# Patient Record
Sex: FEMALE | Race: WHITE | NOT HISPANIC OR LATINO | Employment: STUDENT | URBAN - METROPOLITAN AREA
[De-identification: names, ages, dates, MRNs, and addresses within clinical notes are randomized per-mention and may not be internally consistent; named-entity substitution may affect disease eponyms.]

---
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
## 2022-01-04 ENCOUNTER — IMMUNIZATION (OUTPATIENT)
Dept: FAMILY MEDICINE | Facility: CLINIC | Age: 22
End: 2022-01-04
Payer: COMMERCIAL

## 2022-01-04 ENCOUNTER — IMMUNIZATION (OUTPATIENT)
Dept: PHARMACY | Facility: CLINIC | Age: 22
End: 2022-01-04
Payer: COMMERCIAL

## 2022-01-04 DIAGNOSIS — Z23 NEED FOR VACCINATION: Primary | ICD-10-CM

## 2022-01-04 PROCEDURE — 0004A COVID-19, MRNA, LNP-S, PF, 30 MCG/0.3 ML DOSE VACCINE: CPT | Mod: PBBFAC,PO

## 2022-04-21 ENCOUNTER — OFFICE VISIT (OUTPATIENT)
Dept: OTOLARYNGOLOGY | Facility: CLINIC | Age: 22
End: 2022-04-21
Payer: COMMERCIAL

## 2022-04-21 DIAGNOSIS — M95.0 NASAL VALVE COLLAPSE: ICD-10-CM

## 2022-04-21 DIAGNOSIS — M95.0 NASAL DEFORMITY: Primary | ICD-10-CM

## 2022-04-21 DIAGNOSIS — J34.89 NASAL OBSTRUCTION: ICD-10-CM

## 2022-04-21 PROCEDURE — 1159F MED LIST DOCD IN RCRD: CPT | Mod: CPTII,95,, | Performed by: OTOLARYNGOLOGY

## 2022-04-21 PROCEDURE — 1159F PR MEDICATION LIST DOCUMENTED IN MEDICAL RECORD: ICD-10-PCS | Mod: CPTII,95,, | Performed by: OTOLARYNGOLOGY

## 2022-04-21 PROCEDURE — 99213 PR OFFICE/OUTPT VISIT, EST, LEVL III, 20-29 MIN: ICD-10-PCS | Mod: 95,,, | Performed by: OTOLARYNGOLOGY

## 2022-04-21 PROCEDURE — 1160F PR REVIEW ALL MEDS BY PRESCRIBER/CLIN PHARMACIST DOCUMENTED: ICD-10-PCS | Mod: CPTII,95,, | Performed by: OTOLARYNGOLOGY

## 2022-04-21 PROCEDURE — 1160F RVW MEDS BY RX/DR IN RCRD: CPT | Mod: CPTII,95,, | Performed by: OTOLARYNGOLOGY

## 2022-04-21 PROCEDURE — 99213 OFFICE O/P EST LOW 20 MIN: CPT | Mod: 95,,, | Performed by: OTOLARYNGOLOGY

## 2022-04-21 NOTE — PROGRESS NOTES
Subjective:     Chief Complaint:   Follow up    Sophia Ann Boeckl is a 21 y.o. female who present for postoperative visit.     Patient underwent open septorhinoplasty, ITR on 2/10/21.   Since then, she has been doing well. Breathing much improved but still some limitation on the right. No epistaxis. She has been doing some nasal massages. Feels the nose is straighter but still some external deviation.     Answers for HPI/ROS submitted by the patient on 5/25/2021  None of these: Yes  None of these: Yes  None of these : Yes  None of these: Yes  None of these : Yes  None of these: Yes  None of these: Yes  None of these: Yes  None of these : Yes  None of these: Yes  None of these : Yes  None of these: Yes  None of these: Yes  None of these: Yes        Past Medical History  She has a past medical history of Deviated septum.    Past Surgical History  She has a past surgical history that includes Tonsillectomy; Adenoidectomy; Mart tooth extraction; Nasal turbinate reduction (Bilateral, 2/10/2021); and Rhinoplasty (N/A, 2/10/2021).    Family History  Her family history includes Amblyopia in her father; Breast cancer in her paternal aunt and paternal aunt; Cataracts in her maternal grandfather; Retinal detachment in her maternal aunt; Rhinitis in her maternal grandmother.    Social History  She reports that she has never smoked. She has never used smokeless tobacco. She reports previous alcohol use. She reports that she does not use drugs.    Allergies  She has No Known Allergies.    Medications  She has a current medication list which includes the following prescription(s): kyleena and sumatriptan.       Objective:     There were no vitals taken for this visit.     Constitutional:   Vital signs are normal. She appears well-developed and well-nourished.     Head:  Normocephalic. Salivary glands normal.      Nose:          Procedure    None    Assessment:     1. Nasal deformity    2. Nasal valve collapse    3. Nasal  obstruction       s/p open septorhinoplasty, ITR 2/10/21  Plan:     Patient is doing well postoperatively. Her breathing has improved but still limited on the right. Snoring resolved. She does have slight asymmetry of the middle third/internal nasal valve region with restriction of breathing on the right. Will plan for photos, likely will need in-person evaluation to assess nasal valve. Discussed the risks and benefits of revision nasal valve repair. Discussed likely need for auricular cartilage graft. Patient voices understanding and wishes to proceed. All questions answered and patient encouraged to call with any questions or concerns.

## 2022-04-22 ENCOUNTER — TELEPHONE (OUTPATIENT)
Dept: OTOLARYNGOLOGY | Facility: CLINIC | Age: 22
End: 2022-04-22
Payer: COMMERCIAL

## 2022-04-22 DIAGNOSIS — Z01.818 PRE-OP TESTING: Primary | ICD-10-CM

## 2022-04-22 NOTE — TELEPHONE ENCOUNTER
Called and spoke with the patient and scheduled her surgery, photos, and post op appt.  Will mail her pre/post op information to her house.  Given the phone number to call to set up her pre-admit appt.  Told to call next Friday after Dr. Culp has a chance to put her surgery in.  Confirmed she has the portal to see all of her appts.

## 2022-04-28 DIAGNOSIS — M95.0 NASAL DEFORMITY: Primary | ICD-10-CM

## 2022-04-28 DIAGNOSIS — J34.89 NASAL OBSTRUCTION: ICD-10-CM

## 2022-04-28 DIAGNOSIS — M95.0 NASAL VALVE COLLAPSE: ICD-10-CM

## 2022-04-28 RX ORDER — SODIUM CHLORIDE 0.9 % (FLUSH) 0.9 %
3 SYRINGE (ML) INJECTION
Status: CANCELLED | OUTPATIENT
Start: 2022-04-28

## 2022-04-28 RX ORDER — LIDOCAINE HYDROCHLORIDE 10 MG/ML
1 INJECTION, SOLUTION EPIDURAL; INFILTRATION; INTRACAUDAL; PERINEURAL ONCE
Status: CANCELLED | OUTPATIENT
Start: 2022-04-28 | End: 2022-04-28

## 2022-05-02 ENCOUNTER — TELEPHONE (OUTPATIENT)
Dept: OTOLARYNGOLOGY | Facility: CLINIC | Age: 22
End: 2022-05-02
Payer: COMMERCIAL

## 2022-05-02 NOTE — TELEPHONE ENCOUNTER
Spoke with patient and she was going to take a final.  She states she will call back to try to schedule an appt later today.

## 2022-05-02 NOTE — TELEPHONE ENCOUNTER
----- Message from Lamine Culp MD sent at 4/28/2022  9:03 PM CDT -----  Regarding: preop appt  I know I saw this patient virtually, but I think for insurance approval, we will need to see her in person to document. Can you please get her set up to see me sometime? She has photos scheduled in early May to could maybe coordinate.     Thanks,  Lamine

## 2022-05-04 ENCOUNTER — TELEPHONE (OUTPATIENT)
Dept: OTOLARYNGOLOGY | Facility: CLINIC | Age: 22
End: 2022-05-04
Payer: COMMERCIAL

## 2022-05-04 ENCOUNTER — PATIENT MESSAGE (OUTPATIENT)
Dept: OTOLARYNGOLOGY | Facility: CLINIC | Age: 22
End: 2022-05-04
Payer: COMMERCIAL

## 2022-05-10 ENCOUNTER — PATIENT MESSAGE (OUTPATIENT)
Dept: OTOLARYNGOLOGY | Facility: CLINIC | Age: 22
End: 2022-05-10
Payer: COMMERCIAL

## 2022-05-11 ENCOUNTER — TELEPHONE (OUTPATIENT)
Dept: OTOLARYNGOLOGY | Facility: CLINIC | Age: 22
End: 2022-05-11
Payer: COMMERCIAL

## 2022-05-11 NOTE — TELEPHONE ENCOUNTER
Called and patient needed to reschedule her surgery since her father is dying from cancer. Changed surgery date and all corresponding appts.  Told to call back to reschedule her follow up prior to her new surgery date.

## 2022-05-23 ENCOUNTER — PATIENT MESSAGE (OUTPATIENT)
Dept: OTOLARYNGOLOGY | Facility: CLINIC | Age: 22
End: 2022-05-23
Payer: COMMERCIAL

## 2022-06-06 ENCOUNTER — TELEPHONE (OUTPATIENT)
Dept: OTOLARYNGOLOGY | Facility: CLINIC | Age: 22
End: 2022-06-06
Payer: COMMERCIAL

## 2022-06-06 NOTE — TELEPHONE ENCOUNTER
Left a message that I was in the process of writing up the appeal and noticed her photo appt was cancelled.  I need for her to call back at [phone] and ask to be put through to [facility] and ask for Bekah.  I will need the photos to send in with the appeal and it takes up to 30 days for them to review the appeal.

## 2022-06-08 ENCOUNTER — TELEPHONE (OUTPATIENT)
Dept: OTOLARYNGOLOGY | Facility: CLINIC | Age: 22
End: 2022-06-08
Payer: COMMERCIAL

## 2022-06-23 ENCOUNTER — OFFICE VISIT (OUTPATIENT)
Dept: URGENT CARE | Facility: CLINIC | Age: 22
End: 2022-06-23
Payer: COMMERCIAL

## 2022-06-23 VITALS
RESPIRATION RATE: 18 BRPM | WEIGHT: 150 LBS | HEART RATE: 87 BPM | DIASTOLIC BLOOD PRESSURE: 72 MMHG | TEMPERATURE: 99 F | BODY MASS INDEX: 22.73 KG/M2 | SYSTOLIC BLOOD PRESSURE: 103 MMHG | OXYGEN SATURATION: 97 % | HEIGHT: 68 IN

## 2022-06-23 DIAGNOSIS — J02.9 ACUTE VIRAL PHARYNGITIS: Primary | ICD-10-CM

## 2022-06-23 LAB
CTP QC/QA: YES
CTP QC/QA: YES
MOLECULAR STREP A: NEGATIVE
SARS-COV-2 RDRP RESP QL NAA+PROBE: NEGATIVE

## 2022-06-23 PROCEDURE — 3008F PR BODY MASS INDEX (BMI) DOCUMENTED: ICD-10-PCS | Mod: CPTII,S$GLB,, | Performed by: STUDENT IN AN ORGANIZED HEALTH CARE EDUCATION/TRAINING PROGRAM

## 2022-06-23 PROCEDURE — 1160F RVW MEDS BY RX/DR IN RCRD: CPT | Mod: CPTII,S$GLB,, | Performed by: STUDENT IN AN ORGANIZED HEALTH CARE EDUCATION/TRAINING PROGRAM

## 2022-06-23 PROCEDURE — 87651 POCT STREP A MOLECULAR: ICD-10-PCS | Mod: QW,S$GLB,, | Performed by: STUDENT IN AN ORGANIZED HEALTH CARE EDUCATION/TRAINING PROGRAM

## 2022-06-23 PROCEDURE — 3078F DIAST BP <80 MM HG: CPT | Mod: CPTII,S$GLB,, | Performed by: STUDENT IN AN ORGANIZED HEALTH CARE EDUCATION/TRAINING PROGRAM

## 2022-06-23 PROCEDURE — 1160F PR REVIEW ALL MEDS BY PRESCRIBER/CLIN PHARMACIST DOCUMENTED: ICD-10-PCS | Mod: CPTII,S$GLB,, | Performed by: STUDENT IN AN ORGANIZED HEALTH CARE EDUCATION/TRAINING PROGRAM

## 2022-06-23 PROCEDURE — 1159F MED LIST DOCD IN RCRD: CPT | Mod: CPTII,S$GLB,, | Performed by: STUDENT IN AN ORGANIZED HEALTH CARE EDUCATION/TRAINING PROGRAM

## 2022-06-23 PROCEDURE — 3074F PR MOST RECENT SYSTOLIC BLOOD PRESSURE < 130 MM HG: ICD-10-PCS | Mod: CPTII,S$GLB,, | Performed by: STUDENT IN AN ORGANIZED HEALTH CARE EDUCATION/TRAINING PROGRAM

## 2022-06-23 PROCEDURE — 99213 OFFICE O/P EST LOW 20 MIN: CPT | Mod: S$GLB,,, | Performed by: STUDENT IN AN ORGANIZED HEALTH CARE EDUCATION/TRAINING PROGRAM

## 2022-06-23 PROCEDURE — U0002: ICD-10-PCS | Mod: QW,S$GLB,, | Performed by: STUDENT IN AN ORGANIZED HEALTH CARE EDUCATION/TRAINING PROGRAM

## 2022-06-23 PROCEDURE — 99213 PR OFFICE/OUTPT VISIT, EST, LEVL III, 20-29 MIN: ICD-10-PCS | Mod: S$GLB,,, | Performed by: STUDENT IN AN ORGANIZED HEALTH CARE EDUCATION/TRAINING PROGRAM

## 2022-06-23 PROCEDURE — U0002 COVID-19 LAB TEST NON-CDC: HCPCS | Mod: QW,S$GLB,, | Performed by: STUDENT IN AN ORGANIZED HEALTH CARE EDUCATION/TRAINING PROGRAM

## 2022-06-23 PROCEDURE — 3074F SYST BP LT 130 MM HG: CPT | Mod: CPTII,S$GLB,, | Performed by: STUDENT IN AN ORGANIZED HEALTH CARE EDUCATION/TRAINING PROGRAM

## 2022-06-23 PROCEDURE — 1159F PR MEDICATION LIST DOCUMENTED IN MEDICAL RECORD: ICD-10-PCS | Mod: CPTII,S$GLB,, | Performed by: STUDENT IN AN ORGANIZED HEALTH CARE EDUCATION/TRAINING PROGRAM

## 2022-06-23 PROCEDURE — 3078F PR MOST RECENT DIASTOLIC BLOOD PRESSURE < 80 MM HG: ICD-10-PCS | Mod: CPTII,S$GLB,, | Performed by: STUDENT IN AN ORGANIZED HEALTH CARE EDUCATION/TRAINING PROGRAM

## 2022-06-23 PROCEDURE — 87651 STREP A DNA AMP PROBE: CPT | Mod: QW,S$GLB,, | Performed by: STUDENT IN AN ORGANIZED HEALTH CARE EDUCATION/TRAINING PROGRAM

## 2022-06-23 PROCEDURE — 3008F BODY MASS INDEX DOCD: CPT | Mod: CPTII,S$GLB,, | Performed by: STUDENT IN AN ORGANIZED HEALTH CARE EDUCATION/TRAINING PROGRAM

## 2022-06-23 NOTE — PROGRESS NOTES
"Subjective:       Patient ID: Sophia Ann Boeckl is a 21 y.o. female.    Vitals:  height is 5' 8" (1.727 m) and weight is 68 kg (150 lb). Her oral temperature is 98.5 °F (36.9 °C). Her blood pressure is 103/72 and her pulse is 87. Her respiration is 18 and oxygen saturation is 97%.     Chief Complaint: Sore Throat    Patient states she is having a sore throat and she noticed white spots where her tonsils would be. She complains of swollen glands and states "it's like a golf ball when I try to swallow"    Sore Throat   This is a new problem. The current episode started in the past 7 days (Tuesday ). The problem has been gradually worsening. Neither side of throat is experiencing more pain than the other. There has been no fever. The pain is at a severity of 5/10. The pain is moderate. Associated symptoms include swollen glands and trouble swallowing. Pertinent negatives include no abdominal pain, congestion, coughing, diarrhea, drooling, ear discharge, ear pain, headaches, hoarse voice, plugged ear sensation, neck pain, shortness of breath, stridor or vomiting. She has had no exposure to strep or mono. She has tried NSAIDs for the symptoms. The treatment provided mild relief.       HENT: Positive for sore throat and trouble swallowing. Negative for ear pain, ear discharge, drooling and congestion.    Neck: Negative for neck pain.   Respiratory: Negative for cough, shortness of breath and stridor.    Gastrointestinal: Negative for abdominal pain, vomiting and diarrhea.   Neurological: Negative for headaches.       Objective:      Physical Exam   Constitutional: She is oriented to person, place, and time. She does not appear ill. No distress.   HENT:   Head: Normocephalic.   Mouth/Throat: Uvula is midline. Mucous membranes are moist. Posterior oropharyngeal erythema (mild) present. No oropharyngeal exudate. Tonsils are 0 on the right. Tonsils are 0 on the left. Oropharynx is clear.   Eyes: Conjunctivae are normal. "   Pulmonary/Chest: No respiratory distress.   Neurological: She is alert and oriented to person, place, and time. Coordination normal.   Psychiatric: Her behavior is normal. Mood normal.   Nursing note and vitals reviewed.        Assessment:       1. Acute viral pharyngitis        Results for orders placed or performed in visit on 06/23/22   POCT Strep A, Molecular   Result Value Ref Range    Molecular Strep A, POC Negative Negative     Acceptable Yes    POCT COVID-19 Rapid Screening   Result Value Ref Range    POC Rapid COVID Negative Negative     Acceptable Yes        Plan:         Acute viral pharyngitis  -     POCT Strep A, Molecular  -     POCT COVID-19 Rapid Screening    Diagnosis and plan discussed with the patient as well as the expected course and duration of her symptoms.  Use  OTC medications as directed. All questions and concerns were addressed prior to discharge. Patient verbalized understanding and was agreeable with the plan of care. F/u prn persistent or worsening symptoms.

## 2022-06-23 NOTE — PATIENT INSTRUCTIONS
Below are suggestions for symptomatic relief of your upper respiratory symptoms:              -Salt water gargles to soothe throat pain.              -Chloroseptic spray and Cepacol lozenges also help to numb throat pain.              -Warm herbal teas with honey/lemon/nori can help soothe sore throat and hoarseness              -Nasal saline spray reduces inflammation and dryness.              -Warm face compresses to help with facial sinus pain/pressure.              -Humidifiers and steam can help with nasal dryness and congestion              -Vicks vapor rub at night.              -Flonase OTC or Nasacort OTC for nasal congestion and post-nasal drip. Ok to use twice daily for the first week, then reduce to once daily after symptoms have begun to improve.              -Afrin is a nasal spray that can give immediate relief of nasal congestion but you cannot use this medication for more than 3 days              -Simple foods like chicken noodle soup.              - Mucinex for congestion or Mucinex DM for cough during the day time. Delsym helps with coughing at night. Mucinex-D if you have sinus pressure/sinus pain or chest congestion. (caution if history of high blood pressure or palpitations). You must increase your water intake when using expectorants (Mucinex).            -Zyrtec/Claritin/Allegra/Xyzal should help with allergies.  -If you DO NOT have Hypertension or any history of palpitations, it is ok to take over the counter Sudafed or Mucinex D or Allegra-D or Claritin-D or Zyrtec-D.  -If you do take one of the above, it is ok to combine that with plain over the counter Mucinex or Allegra or Claritin or Zyrtec. If, for example, you are taking Zyrtec -D, you can combine that with Mucinex, but not Mucinex-D.  If you are taking Mucinex-D, you can combine that with plain Allegra or Claritin or Zyrtec.   -If you DO have Hypertension or palpitations, it is safe to take Coricidin HBP for relief of sinus  symptoms.

## 2022-07-04 ENCOUNTER — OFFICE VISIT (OUTPATIENT)
Dept: URGENT CARE | Facility: CLINIC | Age: 22
End: 2022-07-04
Payer: COMMERCIAL

## 2022-07-04 VITALS
BODY MASS INDEX: 22.73 KG/M2 | RESPIRATION RATE: 18 BRPM | SYSTOLIC BLOOD PRESSURE: 101 MMHG | DIASTOLIC BLOOD PRESSURE: 65 MMHG | HEIGHT: 68 IN | WEIGHT: 150 LBS | OXYGEN SATURATION: 99 % | HEART RATE: 91 BPM | TEMPERATURE: 99 F

## 2022-07-04 DIAGNOSIS — S70.362A INFECTED INSECT BITE OF LEFT THIGH, INITIAL ENCOUNTER: Primary | ICD-10-CM

## 2022-07-04 DIAGNOSIS — L08.9 INFECTED INSECT BITE OF LEFT THIGH, INITIAL ENCOUNTER: Primary | ICD-10-CM
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
## 2022-07-04 PROCEDURE — 1159F MED LIST DOCD IN RCRD: CPT | Mod: CPTII,S$GLB,, | Performed by: PHYSICIAN ASSISTANT

## 2022-07-04 PROCEDURE — 3008F BODY MASS INDEX DOCD: CPT | Mod: CPTII,S$GLB,, | Performed by: PHYSICIAN ASSISTANT

## 2022-07-04 PROCEDURE — 3074F SYST BP LT 130 MM HG: CPT | Mod: CPTII,S$GLB,, | Performed by: PHYSICIAN ASSISTANT

## 2022-07-04 PROCEDURE — 1160F RVW MEDS BY RX/DR IN RCRD: CPT | Mod: CPTII,S$GLB,, | Performed by: PHYSICIAN ASSISTANT

## 2022-07-04 PROCEDURE — 1160F PR REVIEW ALL MEDS BY PRESCRIBER/CLIN PHARMACIST DOCUMENTED: ICD-10-PCS | Mod: CPTII,S$GLB,, | Performed by: PHYSICIAN ASSISTANT

## 2022-07-04 PROCEDURE — 99203 PR OFFICE/OUTPT VISIT, NEW, LEVL III, 30-44 MIN: ICD-10-PCS | Mod: S$GLB,,, | Performed by: PHYSICIAN ASSISTANT

## 2022-07-04 PROCEDURE — 1159F PR MEDICATION LIST DOCUMENTED IN MEDICAL RECORD: ICD-10-PCS | Mod: CPTII,S$GLB,, | Performed by: PHYSICIAN ASSISTANT

## 2022-07-04 PROCEDURE — 3008F PR BODY MASS INDEX (BMI) DOCUMENTED: ICD-10-PCS | Mod: CPTII,S$GLB,, | Performed by: PHYSICIAN ASSISTANT

## 2022-07-04 PROCEDURE — 99203 OFFICE O/P NEW LOW 30 MIN: CPT | Mod: S$GLB,,, | Performed by: PHYSICIAN ASSISTANT

## 2022-07-04 PROCEDURE — 3078F DIAST BP <80 MM HG: CPT | Mod: CPTII,S$GLB,, | Performed by: PHYSICIAN ASSISTANT

## 2022-07-04 PROCEDURE — 3074F PR MOST RECENT SYSTOLIC BLOOD PRESSURE < 130 MM HG: ICD-10-PCS | Mod: CPTII,S$GLB,, | Performed by: PHYSICIAN ASSISTANT

## 2022-07-04 PROCEDURE — 3078F PR MOST RECENT DIASTOLIC BLOOD PRESSURE < 80 MM HG: ICD-10-PCS | Mod: CPTII,S$GLB,, | Performed by: PHYSICIAN ASSISTANT

## 2022-07-04 RX ORDER — MUPIROCIN 20 MG/G
OINTMENT TOPICAL 4 TIMES DAILY
Qty: 2 G | Refills: 0 | Status: SHIPPED | OUTPATIENT
Start: 2022-07-04 | End: 2022-07-04

## 2022-07-04 RX ORDER — MUPIROCIN 20 MG/G
OINTMENT TOPICAL 4 TIMES DAILY
Qty: 2 G | Refills: 0 | Status: SHIPPED | OUTPATIENT
Start: 2022-07-04 | End: 2022-07-25

## 2022-07-04 RX ORDER — DOXYCYCLINE 100 MG/1
100 CAPSULE ORAL
Qty: 14 CAPSULE | Refills: 0 | Status: SHIPPED | OUTPATIENT
Start: 2022-07-04 | End: 2022-07-04

## 2022-07-04 RX ORDER — DOXYCYCLINE 100 MG/1
100 CAPSULE ORAL
Qty: 14 CAPSULE | Refills: 0 | Status: SHIPPED | OUTPATIENT
Start: 2022-07-04 | End: 2022-07-25

## 2022-07-04 NOTE — PROGRESS NOTES
"Subjective:       Patient ID: Sophia Ann Boeckl is a 21 y.o. female.    Vitals:  height is 5' 8" (1.727 m) and weight is 68 kg (150 lb). Her temperature is 98.7 °F (37.1 °C). Her blood pressure is 101/65 and her pulse is 91. Her respiration is 18 and oxygen saturation is 99%.     Chief Complaint: Insect Bite    Pt states pain to touch.   Pt states hot and hard to touch,  Pt states swelling noticed as well.   Pt states bite is on back of leg.   Patient thinks she got mid mass pattern insect 2 days ago.     Insect Bite  This is a new problem. The current episode started today. The problem occurs constantly. The problem has been unchanged. Pertinent negatives include no abdominal pain, anorexia, arthralgias, change in bowel habit, chest pain, chills, congestion, coughing, diaphoresis, fatigue, fever, headaches, joint swelling, myalgias, nausea, neck pain, numbness, rash, sore throat, swollen glands, urinary symptoms, vertigo, visual change, vomiting or weakness. She has tried NSAIDs (After-bite) for the symptoms. The treatment provided mild relief.       Constitution: Negative for chills, sweating, fatigue and fever.   HENT: Negative for congestion and sore throat.    Neck: Negative for neck pain.   Cardiovascular: Negative for chest pain.   Respiratory: Negative for cough.    Gastrointestinal: Negative for abdominal pain, nausea and vomiting.   Musculoskeletal: Negative for joint pain, joint swelling and muscle ache.   Skin: Positive for erythema. Negative for rash.   Neurological: Negative for history of vertigo, headaches and numbness.       Objective:      Physical Exam   Constitutional: She is oriented to person, place, and time. She appears well-developed. She is cooperative.  Non-toxic appearance. She does not appear ill. No distress.   HENT:   Head: Normocephalic and atraumatic.   Ears:   Right Ear: Hearing and external ear normal.   Left Ear: Hearing and external ear normal.   Nose: Nose normal. No mucosal " edema, rhinorrhea, nasal deformity or congestion. No epistaxis. Right sinus exhibits no maxillary sinus tenderness and no frontal sinus tenderness. Left sinus exhibits no maxillary sinus tenderness and no frontal sinus tenderness.   Mouth/Throat: Uvula is midline, oropharynx is clear and moist and mucous membranes are normal. No trismus in the jaw. Normal dentition. No uvula swelling. No oropharyngeal exudate, posterior oropharyngeal edema or posterior oropharyngeal erythema.   Eyes: Conjunctivae and lids are normal. Pupils are equal, round, and reactive to light. No scleral icterus. Extraocular movement intact   Neck: Trachea normal and phonation normal. Neck supple. No edema present. No erythema present. No neck rigidity present.   Cardiovascular: Normal rate, regular rhythm, normal heart sounds and normal pulses.   Pulmonary/Chest: Effort normal and breath sounds normal. No stridor. No respiratory distress. She has no decreased breath sounds. She has no wheezes. She has no rhonchi.   Abdominal: Normal appearance. Soft. There is no abdominal tenderness. There is no guarding, no left CVA tenderness and no right CVA tenderness.   Musculoskeletal: Normal range of motion.         General: No deformity. Normal range of motion.        Legs:       Comments: Small area of erythema about 4 cm no induration and no fluctuance.  There is a central area that is about 1 mm oval with hyper erythema.  The area is mildly tender to palpation.  No tracking cellulitis.   Neurological: no focal deficit. She is alert and oriented to person, place, and time. She exhibits normal muscle tone. Coordination normal.   Skin: Skin is warm, dry, intact, not diaphoretic and not pale. Capillary refill takes less than 2 seconds. erythema jaundice  Psychiatric: Her speech is normal and behavior is normal. Judgment and thought content normal.   Nursing note and vitals reviewed.        Assessment:       1. Infected insect bite of left thigh, initial  encounter          Plan:         Infected insect bite of left thigh, initial encounter  -     doxycycline (VIBRAMYCIN) 100 MG Cap; Take 1 capsule (100 mg total) by mouth 2 times daily 2 hours after meal.  Dispense: 14 capsule; Refill: 0  -     mupirocin (BACTROBAN) 2 % ointment; Apply topically 4 (four) times daily.  Dispense: 2 g; Refill: 0    Follow up if symptoms worsen or fail to improve, for F/U with PCP or ED. There are no Patient Instructions on file for this visit.

## 2022-07-19 ENCOUNTER — PATIENT MESSAGE (OUTPATIENT)
Dept: SURGERY | Facility: OTHER | Age: 22
End: 2022-07-19
Payer: COMMERCIAL

## 2022-07-25 ENCOUNTER — HOSPITAL ENCOUNTER (OUTPATIENT)
Dept: PREADMISSION TESTING | Facility: OTHER | Age: 22
Discharge: HOME | End: 2022-07-25
Attending: OTOLARYNGOLOGY
Payer: COMMERCIAL

## 2022-07-25 ENCOUNTER — ANESTHESIA EVENT (OUTPATIENT)
Dept: SURGERY | Facility: OTHER | Age: 22
End: 2022-07-25
Payer: COMMERCIAL

## 2022-07-25 VITALS
WEIGHT: 150 LBS | HEIGHT: 68 IN | BODY MASS INDEX: 22.73 KG/M2 | RESPIRATION RATE: 16 BRPM | TEMPERATURE: 98 F | SYSTOLIC BLOOD PRESSURE: 111 MMHG | OXYGEN SATURATION: 99 % | DIASTOLIC BLOOD PRESSURE: 67 MMHG | HEART RATE: 74 BPM

## 2022-07-25 RX ORDER — LIDOCAINE HYDROCHLORIDE 10 MG/ML
0.5 INJECTION, SOLUTION EPIDURAL; INFILTRATION; INTRACAUDAL; PERINEURAL ONCE
Status: CANCELLED | OUTPATIENT
Start: 2022-07-25 | End: 2022-07-25

## 2022-07-25 RX ORDER — ACETAMINOPHEN 500 MG
1000 TABLET ORAL
Status: CANCELLED | OUTPATIENT
Start: 2022-07-25 | End: 2022-07-25

## 2022-07-25 RX ORDER — SODIUM CHLORIDE, SODIUM LACTATE, POTASSIUM CHLORIDE, CALCIUM CHLORIDE 600; 310; 30; 20 MG/100ML; MG/100ML; MG/100ML; MG/100ML
INJECTION, SOLUTION INTRAVENOUS CONTINUOUS
Status: CANCELLED | OUTPATIENT
Start: 2022-07-25

## 2022-07-25 NOTE — ANESTHESIA PREPROCEDURE EVALUATION
07/25/2022  Sophia Ann Boeckl is a 21 y.o., female.      Pre-op Assessment    I have reviewed the Patient Summary Reports.     I have reviewed the Nursing Notes.    I have reviewed the Medications.     Review of Systems  Anesthesia Hx:  Denies Family Hx of Anesthesia complications.   Denies Personal Hx of Anesthesia complications.   Social:  Non-Smoker    EENT/Dental:   chronic allergic rhinitis   Cardiovascular:  Cardiovascular Normal Exercise tolerance: good     Pulmonary:  Pulmonary Normal    Renal/:  Renal/ Normal     Hepatic/GI:   GERD    Neurological:   Headaches    Endocrine:  Endocrine Normal        Physical Exam  General: Cooperative, Alert and Oriented    Airway:  Mallampati: II   Mouth Opening: Normal  TM Distance: Normal  Tongue: Normal  Neck ROM: Normal ROM    Dental:  Intact        Anesthesia Plan  Type of Anesthesia, risks & benefits discussed:    Anesthesia Type: Gen ETT  Intra-op Monitoring Plan: Standard ASA Monitors  Post Op Pain Control Plan: multimodal analgesia  Induction:  IV  Airway Plan: Video, Post-Induction  Informed Consent: Informed consent signed with the Patient and all parties understand the risks and agree with anesthesia plan.  All questions answered.   ASA Score: 2  Anesthesia Plan Notes: No labs    Ready For Surgery From Anesthesia Perspective.     .

## 2022-07-25 NOTE — DISCHARGE INSTRUCTIONS
Information to Prepare you for your Surgery    PRE-ADMIT TESTING -  [phone]    [address] [facility]          Your surgery has been scheduled at [facility]. We are pleased to have the opportunity to serve you. For Further Information please call [phone].    On the day of surgery please report to the Information Desk on the 1st floor.    CONTACT YOUR PHYSICIAN'S OFFICE THE DAY PRIOR TO YOUR SURGERY TO OBTAIN YOUR ARRIVAL TIME.     The evening before surgery do not eat anything after 9 p.m. ( this includes hard candy, chewing gum and mints).  You may only have GATORADE, POWERADE AND WATER  from 9 p.m. until you leave your home.   DO NOT DRINK ANY LIQUIDS ON THE WAY TO THE HOSPITAL.      Why does your anesthesiologist allow you to drink Gatorade/Powerade before surgery?  Gatorade/Powerade helps to increase your comfort before surgery and to decrease your nausea after surgery. The carbohydrates in Gatorade/Powerade help reduce your body's stress response to surgery.  If you are a diabetic-drink only water prior to surgery.      Current Visitor policy(12/27/2021) - Patients may have 2 visitors pre and post procedure. Only 2 visitors will be allowed in the Surgical building with the patient.     SPECIAL MEDICATION INSTRUCTIONS: TAKE medications checked off by the Anesthesiologist on your Medication List.    Surgery Patients:  If you take ASPIRIN - Your PHYSICIAN/SURGEON will need to inform you IF/OR when you need to stop taking aspirin prior to your surgery.     Do Not take any medications containing IBUPROFEN.    Do Not Wear any make-up (especially eye make-up) to surgery. Please remove any false eyelashes or eyelash extensions. If you arrive the day of surgery with makeup/eyelashes on you will be required to remove prior to surgery. (There is a risk of corneal abrasions if eye makeup/eyelash extensions are not removed)      Leave all valuables at home.    Do Not wear any jewelry or watches, including any metal in body piercings. Jewelry must be removed prior to coming to the hospital.  There is a possibility that rings that are unable to be removed may be cut off if they are on the surgical extremity.    Please remove all hair extensions, wigs, clips and any other metal accessories/ ornaments from your hair.  These items may pose a flammable/fire risk in Surgery and must be removed.    Do not shave your surgical area at least 5 days prior to your surgery. The surgical prep will be performed at the hospital according to Infection Control regulations.    Contact Lens must be removed before surgery. Either do not wear the contact lens or bring a case and solution for storage.  Please bring a container for eyeglasses or dentures as required.  Bring any paperwork your physician has provided, such as consent forms,  history and physicals, doctor's orders, etc.   Bring comfortable clothes that are loose fitting to wear upon discharge. Take into consideration the type of surgery being performed.  Maintain your diet as advised per your physician the day prior to surgery.      Adequate rest the night before surgery is advised.   Park in the Parking lot behind the hospital or in the [employer] [facility] across the street from the parking lot. Parking is complimentary.  If you will be discharged the same day as your procedure, please arrange for a responsible adult to drive you home or to accompany you if traveling by taxi.   YOU WILL NOT BE PERMITTED TO DRIVE OR TO LEAVE THE HOSPITAL ALONE AFTER SURGERY.   If you are being discharged the same day, it is strongly recommended that you arrange for someone to remain with you for the first 24 hrs following your surgery.    The Surgeon will speak to your family/visitor after your surgery regarding the outcome of your surgery and post op care.  The Surgeon may speak to you after your surgery, but there is a possibility you may  not remember the details.  Please check with your family members regarding the conversation with the Surgeon.    We strongly recommend whoever is bringing you home be present for discharge instructions.  This will ensure a thorough understanding for your post op home care.    ALL CHILDREN MUST ALWAYS BE ACCOMPANIED BY AN ADULT.    Visitors-Refer to current Visitor policy handouts.    Thank you for your cooperation.  The Staff of [facility].            Bathing Instructions with Hibiclens    Shower the evening before and morning of your procedure with Hibiclens:  Wash your face with water and your regular face wash/soap  Apply Hibiclens directly on your skin or on a wet washcloth and wash gently. When showering: Move away from the shower stream when applying Hibiclens to avoid rinsing off too soon.  Rinse thoroughly with warm water  Do not dilute Hibiclens        Dry off as usual, do not use any deodorant, powder, body lotions, perfume, after shave or cologne.

## 2022-07-26 ENCOUNTER — TELEPHONE (OUTPATIENT)
Dept: OTOLARYNGOLOGY | Facility: CLINIC | Age: 22
End: 2022-07-26
Payer: COMMERCIAL

## 2022-07-27 ENCOUNTER — TELEPHONE (OUTPATIENT)
Dept: OTOLARYNGOLOGY | Facility: CLINIC | Age: 22
End: 2022-07-27
Payer: COMMERCIAL

## 2022-08-01 ENCOUNTER — PATIENT MESSAGE (OUTPATIENT)
Dept: SURGERY | Facility: OTHER | Age: 22
End: 2022-08-01
Payer: COMMERCIAL

## 2022-08-01 ENCOUNTER — TELEPHONE (OUTPATIENT)
Dept: OTOLARYNGOLOGY | Facility: CLINIC | Age: 22
End: 2022-08-01
Payer: COMMERCIAL

## 2022-08-01 NOTE — TELEPHONE ENCOUNTER
----- Message from Albina Owens RN sent at 8/1/2022  9:51 AM CDT -----  Contact: BOECKL, SOPHIA ANN [[id number]]    ----- Message -----  From: Corina Goodson  Sent: 8/1/2022   9:40 AM CDT  To: Suni Boyd Staff    Type: Call Back      Who called: BOECKL, SOPHIA ANN [[id number]]      What is the request in detail: Patient is requesting a call back from Bekah Gilliland RN. She would like to know if there is an update on the approval of her surgery. Please advise.       Can the clinic reply by [facility]? No      Would the patient rather a call back or a response via [facility]? Call back       Best call back number: [phone] (mobile)      Additional Information:

## 2022-08-02 ENCOUNTER — TELEPHONE (OUTPATIENT)
Dept: OTOLARYNGOLOGY | Facility: CLINIC | Age: 22
End: 2022-08-02
Payer: COMMERCIAL

## 2022-08-02 NOTE — TELEPHONE ENCOUNTER
Nothing to eat after 9pm tonight including candy, gum, or mints but can have clear liquids including gatorade or powerade up to the time you leave your home.  Please be sure to review your post op instructions in the folder you received and also if there are any questions or concerns after your surgery to please remember to call the after hours number of [phone].  If you need to go to the ER please go to [facility] on [address]-there is a resident on call to address any ENT needs.   Please remember to stay on top of your pain and stay hydrated.

## 2022-08-03 ENCOUNTER — ANESTHESIA (OUTPATIENT)
Dept: SURGERY | Facility: OTHER | Age: 22
End: 2022-08-03
Payer: COMMERCIAL

## 2022-08-03 ENCOUNTER — HOSPITAL ENCOUNTER (OUTPATIENT)
Facility: OTHER | Age: 22
Discharge: HOME | End: 2022-08-03
Attending: OTOLARYNGOLOGY | Admitting: OTOLARYNGOLOGY
Payer: COMMERCIAL

## 2022-08-03 DIAGNOSIS — J34.89 NASAL OBSTRUCTION: Primary | ICD-10-CM

## 2022-08-03 DIAGNOSIS — M95.0 NASAL VALVE COLLAPSE: ICD-10-CM

## 2022-08-03 DIAGNOSIS — M95.0 NASAL DEFORMITY: ICD-10-CM

## 2022-08-03 PROBLEM — J34.829 NASAL VALVE COLLAPSE: Status: ACTIVE | Noted: 2021-02-10

## 2022-08-03 LAB
B-HCG UR QL: NEGATIVE
CTP QC/QA: YES

## 2022-08-03 PROCEDURE — 63600175 PHARM REV CODE 636 W HCPCS: Performed by: ANESTHESIOLOGY

## 2022-08-03 PROCEDURE — 20912 PR REMV CARTILAGE FOR GRAFT NASAL: ICD-10-PCS | Mod: 51,,, | Performed by: OTOLARYNGOLOGY

## 2022-08-03 PROCEDURE — 63600175 PHARM REV CODE 636 W HCPCS: Mod: JG | Performed by: STUDENT IN AN ORGANIZED HEALTH CARE EDUCATION/TRAINING PROGRAM

## 2022-08-03 PROCEDURE — 37000008 HC ANESTHESIA 1ST 15 MINUTES: Performed by: OTOLARYNGOLOGY

## 2022-08-03 PROCEDURE — P9045 ALBUMIN (HUMAN), 5%, 250 ML: HCPCS | Mod: JG | Performed by: STUDENT IN AN ORGANIZED HEALTH CARE EDUCATION/TRAINING PROGRAM

## 2022-08-03 PROCEDURE — 63600175 PHARM REV CODE 636 W HCPCS: Performed by: OTOLARYNGOLOGY

## 2022-08-03 PROCEDURE — 25000003 PHARM REV CODE 250: Performed by: STUDENT IN AN ORGANIZED HEALTH CARE EDUCATION/TRAINING PROGRAM

## 2022-08-03 PROCEDURE — 63600175 PHARM REV CODE 636 W HCPCS: Performed by: NURSE ANESTHETIST, CERTIFIED REGISTERED

## 2022-08-03 PROCEDURE — 71000039 HC RECOVERY, EACH ADD'L HOUR: Performed by: OTOLARYNGOLOGY

## 2022-08-03 PROCEDURE — 71000016 HC POSTOP RECOV ADDL HR: Performed by: OTOLARYNGOLOGY

## 2022-08-03 PROCEDURE — 36000707: Performed by: OTOLARYNGOLOGY

## 2022-08-03 PROCEDURE — 36000706: Performed by: OTOLARYNGOLOGY

## 2022-08-03 PROCEDURE — 71000015 HC POSTOP RECOV 1ST HR: Performed by: OTOLARYNGOLOGY

## 2022-08-03 PROCEDURE — 25000003 PHARM REV CODE 250: Performed by: OTOLARYNGOLOGY

## 2022-08-03 PROCEDURE — 63600175 PHARM REV CODE 636 W HCPCS: Performed by: STUDENT IN AN ORGANIZED HEALTH CARE EDUCATION/TRAINING PROGRAM

## 2022-08-03 PROCEDURE — 71000033 HC RECOVERY, INTIAL HOUR: Performed by: OTOLARYNGOLOGY

## 2022-08-03 PROCEDURE — 25000003 PHARM REV CODE 250: Performed by: ANESTHESIOLOGY

## 2022-08-03 PROCEDURE — 30465 PR REPAIR NASAL CAVITY STENOSIS: ICD-10-PCS | Mod: ,,, | Performed by: OTOLARYNGOLOGY

## 2022-08-03 PROCEDURE — 20912 REMOVE CARTILAGE FOR GRAFT: CPT | Mod: 51,,, | Performed by: OTOLARYNGOLOGY

## 2022-08-03 PROCEDURE — 30465 REPAIR NASAL STENOSIS: CPT | Mod: ,,, | Performed by: OTOLARYNGOLOGY

## 2022-08-03 PROCEDURE — 25000003 PHARM REV CODE 250: Performed by: NURSE ANESTHETIST, CERTIFIED REGISTERED

## 2022-08-03 PROCEDURE — 21235 PR GRAFT-EAR CARTILAGE TO NOSE OR EAR: ICD-10-PCS | Mod: 51,,, | Performed by: OTOLARYNGOLOGY

## 2022-08-03 PROCEDURE — 27201423 OPTIME MED/SURG SUP & DEVICES STERILE SUPPLY: Performed by: OTOLARYNGOLOGY

## 2022-08-03 PROCEDURE — 21235 EAR CARTILAGE GRAFT: CPT | Mod: 51,,, | Performed by: OTOLARYNGOLOGY

## 2022-08-03 PROCEDURE — 81025 URINE PREGNANCY TEST: CPT | Performed by: ANESTHESIOLOGY

## 2022-08-03 PROCEDURE — 37000009 HC ANESTHESIA EA ADD 15 MINS: Performed by: OTOLARYNGOLOGY

## 2022-08-03 RX ORDER — OXYCODONE HYDROCHLORIDE 5 MG/1
5 TABLET ORAL
Status: DISCONTINUED | OUTPATIENT
Start: 2022-08-03 | End: 2022-08-03 | Stop reason: HOSPADM

## 2022-08-03 RX ORDER — LIDOCAINE HYDROCHLORIDE 20 MG/ML
INJECTION, SOLUTION EPIDURAL; INFILTRATION; INTRACAUDAL; PERINEURAL
Status: DISCONTINUED | OUTPATIENT
Start: 2022-08-03 | End: 2022-08-03

## 2022-08-03 RX ORDER — OXYCODONE HYDROCHLORIDE 5 MG/1
5 TABLET ORAL ONCE
Status: COMPLETED | OUTPATIENT
Start: 2022-08-03 | End: 2022-08-03

## 2022-08-03 RX ORDER — LIDOCAINE HYDROCHLORIDE AND EPINEPHRINE 10; 10 MG/ML; UG/ML
INJECTION, SOLUTION INFILTRATION; PERINEURAL
Status: DISCONTINUED | OUTPATIENT
Start: 2022-08-03 | End: 2022-08-03 | Stop reason: HOSPADM

## 2022-08-03 RX ORDER — PROCHLORPERAZINE EDISYLATE 5 MG/ML
5 INJECTION INTRAMUSCULAR; INTRAVENOUS EVERY 30 MIN PRN
Status: DISCONTINUED | OUTPATIENT
Start: 2022-08-03 | End: 2022-08-03 | Stop reason: HOSPADM

## 2022-08-03 RX ORDER — LIDOCAINE HYDROCHLORIDE 10 MG/ML
1 INJECTION, SOLUTION EPIDURAL; INFILTRATION; INTRACAUDAL; PERINEURAL ONCE
Status: DISCONTINUED | OUTPATIENT
Start: 2022-08-03 | End: 2022-08-03 | Stop reason: HOSPADM

## 2022-08-03 RX ORDER — ALBUMIN HUMAN 50 G/1000ML
SOLUTION INTRAVENOUS CONTINUOUS PRN
Status: DISCONTINUED | OUTPATIENT
Start: 2022-08-03 | End: 2022-08-03

## 2022-08-03 RX ORDER — ACETAMINOPHEN 500 MG
1000 TABLET ORAL
Status: DISCONTINUED | OUTPATIENT
Start: 2022-08-03 | End: 2022-08-03 | Stop reason: HOSPADM

## 2022-08-03 RX ORDER — AMOXICILLIN AND CLAVULANATE POTASSIUM 875; 125 MG/1; MG/1
1 TABLET, FILM COATED ORAL EVERY 12 HOURS
Qty: 20 TABLET | Refills: 0 | Status: SHIPPED | OUTPATIENT
Start: 2022-08-03 | End: 2022-08-13

## 2022-08-03 RX ORDER — ROCURONIUM BROMIDE 10 MG/ML
INJECTION, SOLUTION INTRAVENOUS
Status: DISCONTINUED | OUTPATIENT
Start: 2022-08-03 | End: 2022-08-03

## 2022-08-03 RX ORDER — ONDANSETRON 4 MG/1
4 TABLET, ORALLY DISINTEGRATING ORAL EVERY 8 HOURS PRN
Qty: 20 TABLET | Refills: 0 | Status: SHIPPED | OUTPATIENT
Start: 2022-08-03 | End: 2024-01-16

## 2022-08-03 RX ORDER — MIDAZOLAM HYDROCHLORIDE 1 MG/ML
INJECTION INTRAMUSCULAR; INTRAVENOUS
Status: DISCONTINUED | OUTPATIENT
Start: 2022-08-03 | End: 2022-08-03

## 2022-08-03 RX ORDER — HYDROCODONE BITARTRATE AND ACETAMINOPHEN 5; 325 MG/1; MG/1
1 TABLET ORAL EVERY 4 HOURS PRN
Qty: 28 TABLET | Refills: 0 | Status: SHIPPED | OUTPATIENT
Start: 2022-08-03 | End: 2024-01-16

## 2022-08-03 RX ORDER — DEXAMETHASONE SODIUM PHOSPHATE 4 MG/ML
INJECTION, SOLUTION INTRA-ARTICULAR; INTRALESIONAL; INTRAMUSCULAR; INTRAVENOUS; SOFT TISSUE
Status: DISCONTINUED | OUTPATIENT
Start: 2022-08-03 | End: 2022-08-03

## 2022-08-03 RX ORDER — DEXMEDETOMIDINE HYDROCHLORIDE 100 UG/ML
INJECTION, SOLUTION INTRAVENOUS
Status: DISCONTINUED | OUTPATIENT
Start: 2022-08-03 | End: 2022-08-03

## 2022-08-03 RX ORDER — LIDOCAINE HYDROCHLORIDE 10 MG/ML
0.5 INJECTION, SOLUTION EPIDURAL; INFILTRATION; INTRACAUDAL; PERINEURAL ONCE
Status: DISCONTINUED | OUTPATIENT
Start: 2022-08-03 | End: 2022-08-03 | Stop reason: HOSPADM

## 2022-08-03 RX ORDER — FENTANYL CITRATE 50 UG/ML
INJECTION, SOLUTION INTRAMUSCULAR; INTRAVENOUS
Status: DISCONTINUED | OUTPATIENT
Start: 2022-08-03 | End: 2022-08-03

## 2022-08-03 RX ORDER — CEFAZOLIN SODIUM 1 G/3ML
2 INJECTION, POWDER, FOR SOLUTION INTRAMUSCULAR; INTRAVENOUS
Status: COMPLETED | OUTPATIENT
Start: 2022-08-03 | End: 2022-08-03

## 2022-08-03 RX ORDER — MEPERIDINE HYDROCHLORIDE 25 MG/ML
12.5 INJECTION INTRAMUSCULAR; INTRAVENOUS; SUBCUTANEOUS ONCE AS NEEDED
Status: DISCONTINUED | OUTPATIENT
Start: 2022-08-03 | End: 2022-08-03 | Stop reason: HOSPADM

## 2022-08-03 RX ORDER — ONDANSETRON HYDROCHLORIDE 2 MG/ML
INJECTION, SOLUTION INTRAMUSCULAR; INTRAVENOUS
Status: DISCONTINUED | OUTPATIENT
Start: 2022-08-03 | End: 2022-08-03

## 2022-08-03 RX ORDER — HYDROMORPHONE HYDROCHLORIDE 2 MG/ML
0.4 INJECTION, SOLUTION INTRAMUSCULAR; INTRAVENOUS; SUBCUTANEOUS EVERY 5 MIN PRN
Status: DISCONTINUED | OUTPATIENT
Start: 2022-08-03 | End: 2022-08-03 | Stop reason: HOSPADM

## 2022-08-03 RX ORDER — DIPHENHYDRAMINE HYDROCHLORIDE 50 MG/ML
25 INJECTION INTRAMUSCULAR; INTRAVENOUS EVERY 6 HOURS PRN
Status: DISCONTINUED | OUTPATIENT
Start: 2022-08-03 | End: 2022-08-03 | Stop reason: HOSPADM

## 2022-08-03 RX ORDER — PHENYLEPHRINE HYDROCHLORIDE 10 MG/ML
INJECTION INTRAVENOUS
Status: DISCONTINUED | OUTPATIENT
Start: 2022-08-03 | End: 2022-08-03

## 2022-08-03 RX ORDER — EPINEPHRINE 1 MG/ML
INJECTION, SOLUTION INTRACARDIAC; INTRAMUSCULAR; INTRAVENOUS; SUBCUTANEOUS
Status: DISCONTINUED | OUTPATIENT
Start: 2022-08-03 | End: 2022-08-03 | Stop reason: HOSPADM

## 2022-08-03 RX ORDER — SODIUM CHLORIDE, SODIUM LACTATE, POTASSIUM CHLORIDE, CALCIUM CHLORIDE 600; 310; 30; 20 MG/100ML; MG/100ML; MG/100ML; MG/100ML
INJECTION, SOLUTION INTRAVENOUS CONTINUOUS
Status: DISCONTINUED | OUTPATIENT
Start: 2022-08-03 | End: 2022-08-03 | Stop reason: HOSPADM

## 2022-08-03 RX ORDER — SODIUM CHLORIDE 0.9 % (FLUSH) 0.9 %
3 SYRINGE (ML) INJECTION
Status: DISCONTINUED | OUTPATIENT
Start: 2022-08-03 | End: 2022-08-03 | Stop reason: HOSPADM

## 2022-08-03 RX ORDER — PROPOFOL 10 MG/ML
VIAL (ML) INTRAVENOUS
Status: DISCONTINUED | OUTPATIENT
Start: 2022-08-03 | End: 2022-08-03

## 2022-08-03 RX ORDER — BACITRACIN 500 [USP'U]/G
OINTMENT TOPICAL 2 TIMES DAILY
Qty: 28 G | Refills: 0 | Status: SHIPPED | OUTPATIENT
Start: 2022-08-03 | End: 2022-08-10

## 2022-08-03 RX ORDER — DIPHENHYDRAMINE HYDROCHLORIDE 50 MG/ML
INJECTION INTRAMUSCULAR; INTRAVENOUS
Status: DISCONTINUED | OUTPATIENT
Start: 2022-08-03 | End: 2022-08-03

## 2022-08-03 RX ORDER — ACETAMINOPHEN 10 MG/ML
INJECTION, SOLUTION INTRAVENOUS
Status: DISCONTINUED | OUTPATIENT
Start: 2022-08-03 | End: 2022-08-03

## 2022-08-03 RX ADMIN — PHENYLEPHRINE HYDROCHLORIDE 100 MCG: 10 INJECTION INTRAVENOUS at 08:08

## 2022-08-03 RX ADMIN — DEXAMETHASONE SODIUM PHOSPHATE 8 MG: 4 INJECTION, SOLUTION INTRAMUSCULAR; INTRAVENOUS at 07:08

## 2022-08-03 RX ADMIN — ROCURONIUM BROMIDE 20 MG: 10 SOLUTION INTRAVENOUS at 07:08

## 2022-08-03 RX ADMIN — ONDANSETRON 4 MG: 2 INJECTION, SOLUTION INTRAMUSCULAR; INTRAVENOUS at 12:08

## 2022-08-03 RX ADMIN — PROPOFOL 200 MG: 10 INJECTION, EMULSION INTRAVENOUS at 07:08

## 2022-08-03 RX ADMIN — PHENYLEPHRINE HYDROCHLORIDE 100 MCG: 10 INJECTION INTRAVENOUS at 12:08

## 2022-08-03 RX ADMIN — SODIUM CHLORIDE, SODIUM LACTATE, POTASSIUM CHLORIDE, AND CALCIUM CHLORIDE: .6; .31; .03; .02 INJECTION, SOLUTION INTRAVENOUS at 06:08

## 2022-08-03 RX ADMIN — ROCURONIUM BROMIDE 15 MG: 10 SOLUTION INTRAVENOUS at 10:08

## 2022-08-03 RX ADMIN — CEFAZOLIN 2 G: 330 INJECTION, POWDER, FOR SOLUTION INTRAMUSCULAR; INTRAVENOUS at 11:08

## 2022-08-03 RX ADMIN — MIDAZOLAM HYDROCHLORIDE 2 MG: 1 INJECTION, SOLUTION INTRAMUSCULAR; INTRAVENOUS at 07:08

## 2022-08-03 RX ADMIN — PHENYLEPHRINE HYDROCHLORIDE 100 MCG: 10 INJECTION INTRAVENOUS at 07:08

## 2022-08-03 RX ADMIN — FENTANYL CITRATE 50 MCG: 50 INJECTION, SOLUTION INTRAMUSCULAR; INTRAVENOUS at 11:08

## 2022-08-03 RX ADMIN — ALBUMIN (HUMAN): 12.5 SOLUTION INTRAVENOUS at 09:08

## 2022-08-03 RX ADMIN — FENTANYL CITRATE 50 MCG: 50 INJECTION, SOLUTION INTRAMUSCULAR; INTRAVENOUS at 10:08

## 2022-08-03 RX ADMIN — SODIUM CHLORIDE, SODIUM LACTATE, POTASSIUM CHLORIDE, AND CALCIUM CHLORIDE: .6; .31; .03; .02 INJECTION, SOLUTION INTRAVENOUS at 08:08

## 2022-08-03 RX ADMIN — DIPHENHYDRAMINE HYDROCHLORIDE 12.5 MG: 50 INJECTION, SOLUTION INTRAMUSCULAR; INTRAVENOUS at 08:08

## 2022-08-03 RX ADMIN — PHENYLEPHRINE HYDROCHLORIDE 100 MCG: 10 INJECTION INTRAVENOUS at 09:08

## 2022-08-03 RX ADMIN — OXYCODONE HYDROCHLORIDE 5 MG: 5 TABLET ORAL at 02:08

## 2022-08-03 RX ADMIN — DEXMEDETOMIDINE HYDROCHLORIDE 20 MCG: 100 INJECTION, SOLUTION, CONCENTRATE INTRAVENOUS at 07:08

## 2022-08-03 RX ADMIN — GLYCOPYRROLATE 0.2 MG: 0.2 INJECTION, SOLUTION INTRAMUSCULAR; INTRAVITREAL at 07:08

## 2022-08-03 RX ADMIN — ROCURONIUM BROMIDE 20 MG: 10 SOLUTION INTRAVENOUS at 11:08

## 2022-08-03 RX ADMIN — HYDROMORPHONE HYDROCHLORIDE 0.4 MG: 2 INJECTION, SOLUTION INTRAMUSCULAR; INTRAVENOUS; SUBCUTANEOUS at 01:08

## 2022-08-03 RX ADMIN — ACETAMINOPHEN 1000 MG: 10 INJECTION, SOLUTION INTRAVENOUS at 08:08

## 2022-08-03 RX ADMIN — SUGAMMADEX 200 MG: 100 INJECTION, SOLUTION INTRAVENOUS at 12:08

## 2022-08-03 RX ADMIN — ROCURONIUM BROMIDE 15 MG: 10 SOLUTION INTRAVENOUS at 08:08

## 2022-08-03 RX ADMIN — ROCURONIUM BROMIDE 50 MG: 10 SOLUTION INTRAVENOUS at 07:08

## 2022-08-03 RX ADMIN — LIDOCAINE HYDROCHLORIDE 100 MG: 20 INJECTION, SOLUTION EPIDURAL; INFILTRATION; INTRACAUDAL; PERINEURAL at 07:08

## 2022-08-03 RX ADMIN — CEFAZOLIN 2 G: 330 INJECTION, POWDER, FOR SOLUTION INTRAMUSCULAR; INTRAVENOUS at 07:08

## 2022-08-03 RX ADMIN — FENTANYL CITRATE 50 MCG: 50 INJECTION, SOLUTION INTRAMUSCULAR; INTRAVENOUS at 12:08

## 2022-08-03 RX ADMIN — FENTANYL CITRATE 100 MCG: 50 INJECTION, SOLUTION INTRAMUSCULAR; INTRAVENOUS at 07:08

## 2022-08-03 RX ADMIN — DEXMEDETOMIDINE HYDROCHLORIDE 10 MCG: 100 INJECTION, SOLUTION, CONCENTRATE INTRAVENOUS at 11:08

## 2022-08-03 NOTE — OP NOTE
Service: Facial Plastic and Reconstructive Surgery    Patient: Sophia Ann Boeckl    MRN: [id number]     Date: 08/03/2022     Preoperative Diagnosis:   1. Nasal obstruction  2. Nasal valve collapse     Postoperative Diagnosis:   1. Nasal obstruction  2. Nasal valve collapse    Surgeon: Lamine Culp MD     Resident Surgeon: Reginald Orozco MD     Procedures:   1. Repair of nasal valves, CPT 47174  2. Septal cartilage graft, CPT 42243  3. Auricular cartilage graft, CPT 48924     Anesthesia: General endotracheal anesthesia       Complications: none     Blood loss: 10 ml     Findings:   Right internal valve collapse  nasal bone deviation to the left     Specimen: none      Retained items:    none     Grafts:  right  grafts - auricular cartilage  Dorsal onlay graft - crushed auricular cartilage   Columellar strut graft - Septal cartilage     Indications: Sophia Ann Boeckl is a pleasant 21 y.o. female who presented with a long history of nasal obstruction. she did not have a prior history of nasal trauma, and she did have a prior septoplasty with nasal valve repair. Unfortunately she experienced persistent then worsening nasal valve collapse. Despite medical management, she has had continued nasal obstruction.  After exam and discussion with the patient, we discussed the risks, benefits and alternatives of the above stated the procedure. she wished to proceed with the aforementioned procedure; written consent was obtained.     Operation:  The patient was properly identified in the holding area where informed written consent was obtained. The patient was brought back to the operating suite and placed supine on the operating room table.  The patient was intubated orally without difficulty. The bed was rotated 90 degrees. A timeout was performed. The bilateral nares were decongested with cocaine-soaked pledgettes.  The septum, columella, nasal dorsum and vestibule were infiltrated with a total of 8 cc of 1% lidocaine with  epinephrine 1:100,000.  The patient was prepped and draped in the normal sterile fashion.      We then turned our attention to the nasal valve portion of the case.  The 11 blade was used to make an inverted-V incision across the columella. This was connected bilaterally to marginal incisions.  The soft tissue envelope was elevated and the nasal bone periosteum was identified. This was divided with the Loving elevator and lifted to expose the nasal bones.  The upper lateral cartilages were then  from the septum bilaterally use 15 blade caudal elevator.  Bilateral septal flaps were raised the submucoperichondrial plane 15 blade was then used to remove our septal cartilage graft maintaining 1.5 cm in the dorsal and caudal strut.  A drainage port was made on the right side of septal flap with with an 11 blade.  The septal graft was then placed in saline on the back table.  Due to her previous septoplasty adequate cartilage was not present for all the grafting required thus we then turned our attention to the right ear.     We then turned our attention to the auricular cartilage graft. The right conchal bowl was utilized. A 15 blade was used to make an incision along the edge of the conchal bowl. The perichondrium was elevated from the underlying cartilage using a freer elevator. Once the cartilage was adequately exposed, the 15 blade was used to excised the cartilage graft. This was removed and placed in saline on the back table. Hemostasis was achieved with the Bovie electrocautery. The incision was closed with a 5-0 fast-gut suture. Several 5-0 fast-gut through-and-through mattress sutures were placed to prevent fluid collection.    We then turned attention to the nasal bones.  Bilateral lateral osteotomies were performed and nasally with a guarded osteotome.  Bilateral medial osteotomies were then performed the straight 4 mm osteotome the nasal bones were mobilized and shifted to the midline position.   Rasping was performed on the left central nasal bony segment to allow for further medialization of the left nasal bone.       The auricular cartilage was then carved to create a right-sided  graft for the internal nasal valve. Two grafts were placed for further symmetry. These were secured with 5-0 PDS sutures in a horizontal mattress fashion between the septum and the upper lateral cartilage. This straightened the middle third of the nose and improved the internal nasal valve.  A columellar strut graft was then carved from the septal cartilage.  The medial footplates were  with iris scissors and dissection was performed to the nasal spine.  The columellar strut graft was then placed between the medial footplates into the pocket up against the nasal spine.  This was secured in place 5-0 PDS suture.  To improve further dorsal symmetry crushed opercular cartilage graft was then placed over the right nasal bone depression.  This further improved the dorsal symmetry. The columellar incision was then closed with 5-0 fast-gut suture in a simple, interrupted fashion. The marginal incision was closed with 5-0 chromic suture. A coapting 4-0 chromic suture was placed to re-approximate the septal flaps.  Small pieces of Gelfoam were placed underneath the nasal bones to speak with them in place.  Surgicel was placed over the marginal incisions in the region of the domes.     The patient was then cleaned and mastisol, steri-strips and the nasal aquaplast splint were placed. An OG tube was used to empty the gastric contents.  The patient was turned back to anesthesia where she was awaked and extubated without complications. she was transported to the PACU in good condition.    I was present and participatory for the entire procedure.

## 2022-08-03 NOTE — TRANSFER OF CARE
"Anesthesia Transfer of Care Note    Patient: Sophia Ann Boeckl    Procedure(s) Performed: Procedure(s) (LRB):  APPLICATION, CARTILAGE GRAFT (N/A)  REPAIR, NOSE, VALVE (N/A)    Patient location: PACU    Anesthesia Type: general    Transport from OR: Transported from OR on 2-3 L/min O2 by NC with adequate spontaneous ventilation    Post pain: adequate analgesia    Post assessment: no apparent anesthetic complications    Post vital signs: stable    Level of consciousness: awake and alert    Nausea/Vomiting: no nausea/vomiting    Complications: none    Transfer of care protocol was followed      Last vitals:   Visit Vitals  /69 (BP Location: Right arm, Patient Position: Sitting)   Pulse 82   Temp 36.9 °C (98.5 °F) (Oral)   Resp 18   Ht 5' 8" (1.727 m)   Wt 68 kg (150 lb)   SpO2 99%   Breastfeeding No   BMI 22.81 kg/m²     "

## 2022-08-03 NOTE — ANESTHESIA PROCEDURE NOTES
Intubation    Date/Time: 8/3/2022 7:17 AM  Performed by: Vangie Wong CRNA  Authorized by: Geoffrey Singer MD     Intubation:     Induction:  Intravenous    Intubated:  Postinduction    Mask Ventilation:  Easy mask    Attempts:  1    Attempted By:  CRNA    Method of Intubation:  Video laryngoscopy    Blade:  Hernandez 3    Laryngeal View Grade: Grade I - full view of cords      Difficult Airway Encountered?: No      Complications:  None    Airway Device:  Oral arslan    Airway Device Size:  7.5    Style/Cuff Inflation:  Cuffed (inflated to minimal occlusive pressure)    Tube secured:  21    Secured at:  The lips    Placement Verified By:  Capnometry    Complicating Factors:  None    Findings Post-Intubation:  BS equal bilateral and atraumatic/condition of teeth unchanged

## 2022-08-03 NOTE — BRIEF OP NOTE
[facility] - Surgery ([place])  Brief Operative Note    Surgery Date: 8/3/2022     Surgeon(s) and Role:     * Lamine Culp MD - Primary    Assisting Surgeon: None    Pre-op Diagnosis:  Nasal deformity [M95.0]  Nasal valve collapse [J34.89]  Nasal obstruction [J34.89]    Post-op Diagnosis:  Post-Op Diagnosis Codes:     * Nasal deformity [M95.0]     * Nasal valve collapse [J34.89]     * Nasal obstruction [J34.89]    Procedure(s) (LRB):  APPLICATION, CARTILAGE GRAFT (N/A)  REPAIR, NOSE, VALVE (N/A)    Anesthesia: General    Operative Findings: see full opnote    Estimated Blood Loss: * No values recorded between 8/3/2022  7:39 AM and 8/3/2022 12:36 PM *         Specimens:   Specimen (24h ago, onward)            None            Discharge Note    OUTCOME: Patient tolerated treatment/procedure well without complication and is now ready for discharge.    DISPOSITION: Home or Self Care    FINAL DIAGNOSIS: nasal obstrucrtion    FOLLOWUP: In clinic    DISCHARGE INSTRUCTIONS:    Discharge Procedure Orders   Lifting restrictions   Order Comments: No heavy lifting >10 lbs x 2 weeks  No straining or exercising x 2 weeks  No nose blowing, sneeze with mouth open     Notify your health care provider if you experience any of the following:  temperature >100.4     Notify your health care provider if you experience any of the following:  persistent nausea and vomiting or diarrhea     Notify your health care provider if you experience any of the following:  severe uncontrolled pain     Notify your health care provider if you experience any of the following:  redness, tenderness, or signs of infection (pain, swelling, redness, odor or green/yellow discharge around incision site)     Leave dressing on - Keep it clean, dry, and intact until clinic visit   Order Comments: Change mustache dressing as needed   Use nasal saline every 4 hours in both nostrils  Use bacitracin ointment to middle portion of nose and nostrils twice a day

## 2022-08-03 NOTE — ANESTHESIA POSTPROCEDURE EVALUATION
Anesthesia Post Evaluation    Patient: Sophia Ann Boeckl    Procedure(s) Performed: Procedure(s) (LRB):  APPLICATION, CARTILAGE GRAFT (N/A)  REPAIR, NOSE, VALVE (N/A)    Final Anesthesia Type: general      Patient location during evaluation: PACU  Patient participation: Yes- Able to Participate  Level of consciousness: awake and alert  Post-procedure vital signs: reviewed and stable  Pain management: adequate  Airway patency: patent    PONV status at discharge: No PONV  Anesthetic complications: no      Cardiovascular status: blood pressure returned to baseline and stable  Respiratory status: unassisted, spontaneous ventilation and room air  Hydration status: euvolemic  Follow-up not needed.          Vitals Value Taken Time   BP 97/56 08/03/22 1418   Temp 36.6 °C (97.8 °F) 08/03/22 1415   Pulse 74 08/03/22 1418   Resp 16 08/03/22 1415   SpO2 95 % 08/03/22 1418   Vitals shown include unvalidated device data.      Event Time   Out of Recovery 14:19:14         Pain/Milton Score: Pain Rating Prior to Med Admin: 7 (8/3/2022  1:23 PM)  Pain Rating Post Med Admin: 3 (8/3/2022  2:15 PM)  Milton Score: 10 (8/3/2022  2:15 PM)

## 2022-08-03 NOTE — DISCHARGE INSTRUCTIONS

## 2022-08-03 NOTE — H&P
Subjective:      Chief Complaint:   Follow up     Sophia Ann Boeckl is a 21 y.o. female who present for postoperative visit.      Patient underwent open septorhinoplasty, ITR on 2/10/21.   Since then, she has been doing well. Breathing much improved but still some limitation on the right. No epistaxis. She has been doing some nasal massages. Feels the nose is straighter but still some external deviation.      Answers for HPI/ROS submitted by the patient on 5/25/2021  None of these: Yes  None of these: Yes  None of these : Yes  None of these: Yes  None of these : Yes  None of these: Yes  None of these: Yes  None of these: Yes  None of these : Yes  None of these: Yes  None of these : Yes  None of these: Yes  None of these: Yes  None of these: Yes           Past Medical History  She has a past medical history of Deviated septum.     Past Surgical History  She has a past surgical history that includes Tonsillectomy; Adenoidectomy; [place] tooth extraction; Nasal turbinate reduction (Bilateral, 2/10/2021); and Rhinoplasty (N/A, 2/10/2021).     Family History  Her family history includes Amblyopia in her father; Breast cancer in her paternal aunt and paternal aunt; Cataracts in her maternal grandfather; Retinal detachment in her maternal aunt; Rhinitis in her maternal grandmother.     Social History  She reports that she has never smoked. She has never used smokeless tobacco. She reports previous alcohol use. She reports that she does not use drugs.     Allergies  She has No Known Allergies.     Medications  She has a current medication list which includes the following prescription(s): kyleena and sumatriptan.        Objective:      There were no vitals taken for this visit.      Constitutional:   Vital signs are normal. She appears well-developed and well-nourished.      Head:  Normocephalic. Salivary glands normal.       Nose:           Procedure     None     Assessment:      1. Nasal deformity    2. Nasal valve  collapse    3. Nasal obstruction        s/p open septorhinoplasty, ITR 2/10/21  Plan:      Patient is doing well postoperatively. Her breathing has improved but still limited on the right. Snoring resolved. She does have slight asymmetry of the middle third/internal nasal valve region with restriction of breathing on the right. Will plan for photos, likely will need in-person evaluation to assess nasal valve. Discussed the risks and benefits of revision nasal valve repair. Discussed likely need for auricular cartilage graft. Patient voices understanding and wishes to proceed. All questions answered and patient encouraged to call with any questions or concerns.

## 2022-08-03 NOTE — PLAN OF CARE
Buffy Pete Boeckl has met all discharge criteria from Phase II. Vital Signs are stable, ambulating  without difficulty.Pain is now under control and tolerable for the pt. Pain score 3 at this time.  Discharge instructions given, patient verbalized understanding. Discharged from facility via wheelchair in stable condition.

## 2022-08-04 VITALS
TEMPERATURE: 98 F | BODY MASS INDEX: 22.73 KG/M2 | DIASTOLIC BLOOD PRESSURE: 68 MMHG | HEIGHT: 68 IN | SYSTOLIC BLOOD PRESSURE: 113 MMHG | OXYGEN SATURATION: 100 % | HEART RATE: 83 BPM | WEIGHT: 150 LBS | RESPIRATION RATE: 16 BRPM

## 2022-08-09 ENCOUNTER — OFFICE VISIT (OUTPATIENT)
Dept: OTOLARYNGOLOGY | Facility: CLINIC | Age: 22
End: 2022-08-09
Payer: COMMERCIAL

## 2022-08-09 VITALS — HEART RATE: 74 BPM | SYSTOLIC BLOOD PRESSURE: 104 MMHG | TEMPERATURE: 98 F | DIASTOLIC BLOOD PRESSURE: 72 MMHG

## 2022-08-09 DIAGNOSIS — M95.0 NASAL VALVE COLLAPSE: Primary | ICD-10-CM

## 2022-08-09 PROCEDURE — 99024 PR POST-OP FOLLOW-UP VISIT: ICD-10-PCS | Mod: S$GLB,,, | Performed by: OTOLARYNGOLOGY

## 2022-08-09 PROCEDURE — 3078F PR MOST RECENT DIASTOLIC BLOOD PRESSURE < 80 MM HG: ICD-10-PCS | Mod: CPTII,S$GLB,, | Performed by: OTOLARYNGOLOGY

## 2022-08-09 PROCEDURE — 3074F PR MOST RECENT SYSTOLIC BLOOD PRESSURE < 130 MM HG: ICD-10-PCS | Mod: CPTII,S$GLB,, | Performed by: OTOLARYNGOLOGY

## 2022-08-09 PROCEDURE — 99024 POSTOP FOLLOW-UP VISIT: CPT | Mod: S$GLB,,, | Performed by: OTOLARYNGOLOGY

## 2022-08-09 PROCEDURE — 3078F DIAST BP <80 MM HG: CPT | Mod: CPTII,S$GLB,, | Performed by: OTOLARYNGOLOGY

## 2022-08-09 PROCEDURE — 3074F SYST BP LT 130 MM HG: CPT | Mod: CPTII,S$GLB,, | Performed by: OTOLARYNGOLOGY

## 2022-08-09 PROCEDURE — 1159F MED LIST DOCD IN RCRD: CPT | Mod: CPTII,S$GLB,, | Performed by: OTOLARYNGOLOGY

## 2022-08-09 PROCEDURE — 1160F PR REVIEW ALL MEDS BY PRESCRIBER/CLIN PHARMACIST DOCUMENTED: ICD-10-PCS | Mod: CPTII,S$GLB,, | Performed by: OTOLARYNGOLOGY

## 2022-08-09 PROCEDURE — 1160F RVW MEDS BY RX/DR IN RCRD: CPT | Mod: CPTII,S$GLB,, | Performed by: OTOLARYNGOLOGY

## 2022-08-09 PROCEDURE — 1159F PR MEDICATION LIST DOCUMENTED IN MEDICAL RECORD: ICD-10-PCS | Mod: CPTII,S$GLB,, | Performed by: OTOLARYNGOLOGY

## 2022-08-09 NOTE — PROGRESS NOTES
Subjective:     Chief Complaint:   Chief Complaint   Patient presents with    Post-op Evaluation     NVR       Sophia Ann Boeckl is a 21 y.o. female who present for postoperative visit.     Patient underwent nasal valve repair, auricular cartilage graft on 8/3/22.   Since then, she has been doing well. Pain improving.     Past Medical History  She has a past medical history of Deviated septum.    Past Surgical History  She has a past surgical history that includes Tonsillectomy; Adenoidectomy; [place] tooth extraction; Nasal turbinate reduction (Bilateral, 2/10/2021); Rhinoplasty (N/A, 2/10/2021); Application of cartilage graft (N/A, 8/3/2022); and Nasal stenosis repair (N/A, 8/3/2022).    Family History  Her family history includes Amblyopia in her father; Breast cancer in her paternal aunt and paternal aunt; Cataracts in her maternal grandfather; Retinal detachment in her maternal aunt; Rhinitis in her maternal grandmother.    Social History  She reports that she has never smoked. She has never used smokeless tobacco. She reports previous alcohol use. She reports that she does not use drugs.    Allergies  She has No Known Allergies.    Medications  She has a current medication list which includes the following prescription(s): amoxicillin-clavulanate 875-125mg, bacitracin, hydrocodone-acetaminophen, kyleena, multivit with calcium,iron,min, ondansetron, sumatriptan, and UNABLE TO FIND.       Objective:     /72   Pulse 74   Temp 97.7 °F (36.5 °C) (Temporal)      Constitutional:   Vital signs are normal. She appears well-developed and well-nourished.     Head:  Normocephalic.     Nose:            Procedure    None        Assessment:     1. Nasal valve collapse      S/p revision nasal valve repair, auricular cartilage graft on 8/3/22    Plan:     Patient is doing well postoperatively. We discussed postop instructions including nasal saline q4 hours while awake, aquaphor or vaseline twice a day to incision  and within nostrils. No nose blowing x 3 weeks. Discussed postop activity limitations. Follow up in 3 weeks, sooner if needed. All questions answered and patient encouraged to call with any questions or concerns.

## 2022-11-02 ENCOUNTER — OFFICE VISIT (OUTPATIENT)
Dept: URGENT CARE | Facility: CLINIC | Age: 22
End: 2022-11-02
Payer: COMMERCIAL

## 2022-11-02 VITALS
SYSTOLIC BLOOD PRESSURE: 106 MMHG | RESPIRATION RATE: 16 BRPM | DIASTOLIC BLOOD PRESSURE: 62 MMHG | OXYGEN SATURATION: 97 % | HEIGHT: 68 IN | HEART RATE: 63 BPM | BODY MASS INDEX: 22.73 KG/M2 | WEIGHT: 150 LBS | TEMPERATURE: 98 F

## 2022-11-02 DIAGNOSIS — Z11.3 SCREEN FOR STD (SEXUALLY TRANSMITTED DISEASE): Primary | ICD-10-CM

## 2022-11-02 PROCEDURE — 3074F SYST BP LT 130 MM HG: CPT | Mod: CPTII,S$GLB,, | Performed by: PHYSICIAN ASSISTANT

## 2022-11-02 PROCEDURE — 99213 PR OFFICE/OUTPT VISIT, EST, LEVL III, 20-29 MIN: ICD-10-PCS | Mod: S$GLB,,, | Performed by: PHYSICIAN ASSISTANT

## 2022-11-02 PROCEDURE — 99213 OFFICE O/P EST LOW 20 MIN: CPT | Mod: S$GLB,,, | Performed by: PHYSICIAN ASSISTANT

## 2022-11-02 PROCEDURE — 3008F BODY MASS INDEX DOCD: CPT | Mod: CPTII,S$GLB,, | Performed by: PHYSICIAN ASSISTANT

## 2022-11-02 PROCEDURE — 3078F DIAST BP <80 MM HG: CPT | Mod: CPTII,S$GLB,, | Performed by: PHYSICIAN ASSISTANT

## 2022-11-02 PROCEDURE — 1160F RVW MEDS BY RX/DR IN RCRD: CPT | Mod: CPTII,S$GLB,, | Performed by: PHYSICIAN ASSISTANT

## 2022-11-02 PROCEDURE — 1159F MED LIST DOCD IN RCRD: CPT | Mod: CPTII,S$GLB,, | Performed by: PHYSICIAN ASSISTANT

## 2022-11-02 PROCEDURE — 3008F PR BODY MASS INDEX (BMI) DOCUMENTED: ICD-10-PCS | Mod: CPTII,S$GLB,, | Performed by: PHYSICIAN ASSISTANT

## 2022-11-02 PROCEDURE — 3078F PR MOST RECENT DIASTOLIC BLOOD PRESSURE < 80 MM HG: ICD-10-PCS | Mod: CPTII,S$GLB,, | Performed by: PHYSICIAN ASSISTANT

## 2022-11-02 PROCEDURE — 3074F PR MOST RECENT SYSTOLIC BLOOD PRESSURE < 130 MM HG: ICD-10-PCS | Mod: CPTII,S$GLB,, | Performed by: PHYSICIAN ASSISTANT

## 2022-11-02 PROCEDURE — 1160F PR REVIEW ALL MEDS BY PRESCRIBER/CLIN PHARMACIST DOCUMENTED: ICD-10-PCS | Mod: CPTII,S$GLB,, | Performed by: PHYSICIAN ASSISTANT

## 2022-11-02 PROCEDURE — 1159F PR MEDICATION LIST DOCUMENTED IN MEDICAL RECORD: ICD-10-PCS | Mod: CPTII,S$GLB,, | Performed by: PHYSICIAN ASSISTANT

## 2022-11-02 NOTE — PROGRESS NOTES
"Subjective:       Patient ID: Sophia Ann Boeckl is a 22 y.o. female.    Vitals:  height is 5' 8" (1.727 m) and weight is 68 kg (150 lb). Her oral temperature is 98.3 °F (36.8 °C). Her blood pressure is 106/62 and her pulse is 63. Her respiration is 16 and oxygen saturation is 97%.     Chief Complaint: Exposure to STD    Patient presents to urgent care for G/C/Trich testing. Patient is asymptomatic at this time. Patient reports that she is entering a new relationship with new partner. Patient reports that she has an IUD and no chance she is pregnant. Patient reports that she is sexually active with protection.    Exposure to STD   The patient's pertinent negatives include no discharge, dyspareunia, dysuria, genital itching, genital lesions, genital rash or pelvic pain. This is a new problem. The current episode started today. The problem has been unchanged. The vaginal discharge was normal. Pertinent negatives include no abdominal pain, anorexia, diaphoresis, fever, genital odor, rectal pain, sore throat or urinary frequency. She has tried nothing for the symptoms.       Constitution: Negative for chills, sweating, fatigue and fever.   HENT:  Negative for ear pain, drooling, congestion, sore throat, trouble swallowing and voice change.    Neck: Negative for neck pain, neck stiffness, painful lymph nodes and neck swelling.   Cardiovascular:  Negative for chest pain, leg swelling, palpitations, sob on exertion and passing out.   Eyes:  Negative for eye pain, eye redness, photophobia, double vision, blurred vision and eyelid swelling.   Respiratory:  Negative for chest tightness, cough, sputum production, bloody sputum, shortness of breath, stridor and wheezing.    Gastrointestinal:  Negative for abdominal pain, abdominal bloating, nausea, vomiting, constipation, diarrhea, rectal pain and heartburn.   Genitourinary:  Negative for dysuria, frequency, urgency, flank pain, hematuria, vaginal pain, vaginal discharge, " vaginal bleeding, vaginal odor, painful ejaculation, genital sore, painful ejaculation and pelvic pain.   Musculoskeletal:  Negative for joint pain, joint swelling, abnormal ROM of joint, back pain, muscle cramps and muscle ache.   Skin:  Negative for rash and hives.   Allergic/Immunologic: Negative for seasonal allergies, food allergies, hives, itching and sneezing.   Neurological:  Negative for dizziness, light-headedness, passing out, loss of balance, headaches, altered mental status, loss of consciousness and seizures.   Hematologic/Lymphatic: Negative for swollen lymph nodes.   Psychiatric/Behavioral:  Negative for altered mental status and nervous/anxious. The patient is not nervous/anxious.    Objective:      Physical Exam   Constitutional: She is oriented to person, place, and time. She appears well-developed. She is cooperative.  Non-toxic appearance. She does not appear ill. No distress.   HENT:   Head: Normocephalic and atraumatic.   Ears:   Right Ear: External ear normal.   Left Ear: External ear normal.   Nose: Nose normal. No nasal deformity. No epistaxis.   Mouth/Throat: Uvula is midline, oropharynx is clear and moist and mucous membranes are normal.   Eyes: Conjunctivae and lids are normal. No scleral icterus.   Neck: Trachea normal and phonation normal. Neck supple. No edema present. No erythema present. No neck rigidity present.   Cardiovascular: Normal rate, regular rhythm, normal heart sounds and normal pulses.   Pulmonary/Chest: Effort normal and breath sounds normal. No accessory muscle usage or stridor. No respiratory distress. She has no decreased breath sounds. She has no wheezes. She has no rhonchi. She has no rales.   Abdominal: Normal appearance and bowel sounds are normal. Soft. There is no abdominal tenderness. There is no rebound, no guarding, no left CVA tenderness and no right CVA tenderness.   Musculoskeletal: Normal range of motion.         General: No deformity. Normal range of  motion.   Neurological: She is alert and oriented to person, place, and time. She exhibits normal muscle tone. Coordination normal.   Skin: Skin is warm, dry, intact, not diaphoretic, not pale and no rash. Capillary refill takes less than 2 seconds.   Psychiatric: Her speech is normal and behavior is normal. Judgment and thought content normal.   Nursing note and vitals reviewed.      Assessment:       1. Screen for STD (sexually transmitted disease)          Plan:     Will call with lab results. Offered other testing, but patient deferred at this time. Advised close follow-up with PCP and/or Specialist for further evaluation as needed. ER precautions given to patient as well. Patient aware, verbalized understanding and agreed with plan of care.    Screen for STD (sexually transmitted disease)  -     Nuab Vaginitis Plus (VG+)       Patient Instructions   If you were prescribed a narcotic or controlled medication, do not drive or operate heavy equipment or machinery while taking these medications.  You must understand that you've received an Urgent Care treatment only and that you may be released before all your medical problems are known or treated. You, the patient, will arrange for follow up care as instructed.  Follow up with your PCP or specialty clinic as directed if not improved or as needed. You can call [phone] to schedule an appointment with the appropriate provider.  If your condition worsens we recommend that you receive another evaluation at the Emergency Department for any concerns or worsening of condition.  Patient aware and verbalized understanding.    We will call you back with lab results.  Advised patient to abstain from sexual intercourse at this time.  Encouraged safe sex practices with consistent condom use.  Advised patient to follow-up with PCP and/or Urology for further evaluation as needed.  ER precautions given to patient.  Patient aware and verbalized understanding.

## 2022-11-02 NOTE — PATIENT INSTRUCTIONS
You must understand that you've received an Urgent Care treatment only and that you may be released before all your medical problems are known or treated. You, the patient, will arrange for follow up care as instructed.  Follow up with your PCP or specialty clinic as directed if not improved or as needed. You can call [phone] to schedule an appointment with the appropriate provider.  If your condition worsens we recommend that you receive another evaluation at the Emergency Department for any concerns or worsening of condition.  Patient aware and verbalized understanding.    We will call you back with lab results.  Advised patient to abstain from sexual intercourse at this time.  Encouraged safe sex practices with consistent condom use.  Advised patient to follow-up with PCP and/or Urology for further evaluation as needed.  ER precautions given to patient.  Patient aware and verbalized understanding.

## 2022-11-08 LAB
A VAGINAE DNA VAG QL NAA+PROBE: NORMAL SCORE
BVAB2 DNA VAG QL NAA+PROBE: NORMAL SCORE
C ALBICANS DNA VAG QL NAA+PROBE: NEGATIVE
C GLABRATA DNA VAG QL NAA+PROBE: NEGATIVE
C TRACH DNA VAG QL NAA+PROBE: NEGATIVE
MEGA1 DNA VAG QL NAA+PROBE: NORMAL SCORE
N GONORRHOEA DNA VAG QL NAA+PROBE: NEGATIVE
T VAGINALIS DNA VAG QL NAA+PROBE: NEGATIVE

## 2022-11-10 ENCOUNTER — TELEPHONE (OUTPATIENT)
Dept: URGENT CARE | Facility: CLINIC | Age: 22
End: 2022-11-10
Payer: COMMERCIAL

## 2022-11-14 ENCOUNTER — TELEPHONE (OUTPATIENT)
Dept: URGENT CARE | Facility: CLINIC | Age: 22
End: 2022-11-14
Payer: COMMERCIAL

## 2022-11-22 ENCOUNTER — TELEPHONE (OUTPATIENT)
Dept: URGENT CARE | Facility: CLINIC | Age: 22
End: 2022-11-22
Payer: COMMERCIAL

## 2023-07-11 ENCOUNTER — OFFICE VISIT (OUTPATIENT)
Dept: OBSTETRICS AND GYNECOLOGY | Facility: CLINIC | Age: 23
End: 2023-07-11
Payer: COMMERCIAL

## 2023-07-11 VITALS
SYSTOLIC BLOOD PRESSURE: 100 MMHG | DIASTOLIC BLOOD PRESSURE: 64 MMHG | BODY MASS INDEX: 23.29 KG/M2 | HEIGHT: 68 IN | WEIGHT: 153.69 LBS

## 2023-07-11 DIAGNOSIS — T83.32XA INTRAUTERINE CONTRACEPTIVE DEVICE THREADS LOST, INITIAL ENCOUNTER: Primary | ICD-10-CM

## 2023-07-11 PROCEDURE — 99213 OFFICE O/P EST LOW 20 MIN: CPT | Mod: S$GLB,,, | Performed by: OBSTETRICS & GYNECOLOGY

## 2023-07-11 PROCEDURE — 1159F MED LIST DOCD IN RCRD: CPT | Mod: CPTII,S$GLB,, | Performed by: OBSTETRICS & GYNECOLOGY

## 2023-07-11 PROCEDURE — 3078F PR MOST RECENT DIASTOLIC BLOOD PRESSURE < 80 MM HG: ICD-10-PCS | Mod: CPTII,S$GLB,, | Performed by: OBSTETRICS & GYNECOLOGY

## 2023-07-11 PROCEDURE — 3074F PR MOST RECENT SYSTOLIC BLOOD PRESSURE < 130 MM HG: ICD-10-PCS | Mod: CPTII,S$GLB,, | Performed by: OBSTETRICS & GYNECOLOGY

## 2023-07-11 PROCEDURE — 3078F DIAST BP <80 MM HG: CPT | Mod: CPTII,S$GLB,, | Performed by: OBSTETRICS & GYNECOLOGY

## 2023-07-11 PROCEDURE — 3008F PR BODY MASS INDEX (BMI) DOCUMENTED: ICD-10-PCS | Mod: CPTII,S$GLB,, | Performed by: OBSTETRICS & GYNECOLOGY

## 2023-07-11 PROCEDURE — 99999 PR PBB SHADOW E&M-EST. PATIENT-LVL III: CPT | Mod: PBBFAC,,, | Performed by: OBSTETRICS & GYNECOLOGY

## 2023-07-11 PROCEDURE — 1159F PR MEDICATION LIST DOCUMENTED IN MEDICAL RECORD: ICD-10-PCS | Mod: CPTII,S$GLB,, | Performed by: OBSTETRICS & GYNECOLOGY

## 2023-07-11 PROCEDURE — 3074F SYST BP LT 130 MM HG: CPT | Mod: CPTII,S$GLB,, | Performed by: OBSTETRICS & GYNECOLOGY

## 2023-07-11 PROCEDURE — 99213 PR OFFICE/OUTPT VISIT, EST, LEVL III, 20-29 MIN: ICD-10-PCS | Mod: S$GLB,,, | Performed by: OBSTETRICS & GYNECOLOGY

## 2023-07-11 PROCEDURE — 99999 PR PBB SHADOW E&M-EST. PATIENT-LVL III: ICD-10-PCS | Mod: PBBFAC,,, | Performed by: OBSTETRICS & GYNECOLOGY

## 2023-07-11 PROCEDURE — 3008F BODY MASS INDEX DOCD: CPT | Mod: CPTII,S$GLB,, | Performed by: OBSTETRICS & GYNECOLOGY

## 2023-07-11 NOTE — PROGRESS NOTES
Chief Complaint   Patient presents with    IUD Check       History of Present Illness: Sophia Ann Boeckl is a 22 y.o. female that presents today 7/11/2023 with LMP Patient's last menstrual period was 07/02/2023. for   Chief Complaint   Patient presents with    IUD Check         Past Medical History:   Diagnosis Date    Deviated septum        Past Surgical History:   Procedure Laterality Date    ADENOIDECTOMY      APPLICATION OF CARTILAGE GRAFT N/A 8/3/2022    Procedure: APPLICATION, CARTILAGE GRAFT;  Surgeon: Lamine Culp MD;  Location: [facility] OR;  Service: ENT;  Laterality: N/A;  FROM right  EAR TO NOSE    NASAL STENOSIS REPAIR N/A 8/3/2022    Procedure: REPAIR, NOSE, VALVE;  Surgeon: Lamine Culp MD;  Location: [facility] OR;  Service: ENT;  Laterality: N/A;    NASAL TURBINATE REDUCTION Bilateral 2/10/2021    Procedure: REDUCTION, NASAL TURBINATE;  Surgeon: Lamine Culp MD;  Location: [facility] OR;  Service: ENT;  Laterality: Bilateral;    RHINOPLASTY N/A 2/10/2021    Procedure: SEPTORHINOPLASTY;  Surgeon: Lamine Culp MD;  Location: [facility] OR;  Service: ENT;  Laterality: N/A;    TONSILLECTOMY      WISDOM TOOTH EXTRACTION         Current Outpatient Medications   Medication Sig Dispense Refill    levonorgestreL (KYLEENA) 17.5 mcg/24 hrs (5 yrs) 19.5 mg IUD 1 each by Intrauterine route once.      multivit with calcium,iron,min (WOMEN'S DAILY MULTIVITAMIN ORAL) Take by mouth once daily at 6am.      sumatriptan (IMITREX) 50 MG tablet Take 1 tablet (50 mg total) by mouth every 8 (eight) hours. Prn headache 12 tablet 2    UNABLE TO FIND Take by mouth once daily at 6am. Iron supplement      HYDROcodone-acetaminophen (NORCO) 5-325 mg per tablet Take 1 tablet by mouth every 4 (four) hours as needed for Pain. 28 tablet 0    ondansetron (ZOFRAN-ODT) 4 MG TbDL Take 1 tablet (4 mg total) by mouth every 8 (eight) hours as needed (nausea). 20 tablet 0     No current facility-administered medications for this visit.       Review of  "patient's allergies indicates:  No Known Allergies    Family History   Problem Relation Age of Onset    Rhinitis Maternal Grandmother     Amblyopia Father     Retinal detachment Maternal Aunt     Cataracts Maternal Grandfather     Breast cancer Paternal Aunt     Breast cancer Paternal Aunt     Urticaria Neg Hx     Immunodeficiency Neg Hx     Eczema Neg Hx     Atopy Neg Hx     Asthma Neg Hx     Angioedema Neg Hx     Allergies Neg Hx     Allergic rhinitis Neg Hx     Ovarian cancer Neg Hx        Social History     Tobacco Use    Smoking status: Never    Smokeless tobacco: Never   Substance Use Topics    Alcohol use: Not Currently    Drug use: Never       OB History   No obstetric history on file.         /64   Ht 5' 8" (1.727 m)   Wt 69.7 kg (153 lb 10.6 oz)   LMP 07/02/2023   Physical Exam:  APPEARANCE: Well nourished, well developed, in no acute distress.  SKIN: Normal skin turgor, no lesions.  NECK: Neck symmetric without masses   RESPIRATORY: Normal respiratory effort with no retractions or use of accessory muscles  CARDIOVASCULAR: Peripheral vascular system with no swelling no varicosities and palpation of pulses normal  LYMPHATIC: No enlargements of the lymph nodes noted in the neck, axillae, or groin  ABDOMEN: Soft. No tenderness or masses. No hepatosplenomegaly. No hernias.  PELVIC: Normal external female genitalia without lesions. Normal hair distribution. Adequate perineal body, normal urethral meatus. Urethra with no masses.  Bladder nontender. Vagina moist and well rugated without lesions or discharge. Cervix pink and without lesions. No significant cystocele or rectocele. Bimanual exam showed uterus normal size, shape, position, mobile and nontender. Adnexa without masses or tenderness. Urethra and bladder normal.  EXTREMITIES: No clubbing cyanosis or edema.    ASSESSMENT/PLAN:  Intrauterine contraceptive device threads lost, initial encounter  -     US Pelvis Limited_IUD Placement or Check; " Future; Expected date: 07/11/2023        20 minutes spent today preparing reviewing previous external notes, reviewing previous results, performing medical examination, orders tests or medications, counseling and documenting.

## 2023-07-13 ENCOUNTER — HOSPITAL ENCOUNTER (OUTPATIENT)
Dept: RADIOLOGY | Facility: OTHER | Age: 23
Discharge: HOME | End: 2023-07-13
Attending: OBSTETRICS & GYNECOLOGY
Payer: COMMERCIAL

## 2023-07-13 DIAGNOSIS — T83.32XA INTRAUTERINE CONTRACEPTIVE DEVICE THREADS LOST, INITIAL ENCOUNTER: ICD-10-CM

## 2023-07-13 PROCEDURE — 76830 US PELVIS COMP WITH TRANSVAG NON-OB (XPD): ICD-10-PCS | Mod: 26,,, | Performed by: RADIOLOGY

## 2023-07-13 PROCEDURE — 76856 US PELVIS COMP WITH TRANSVAG NON-OB (XPD): ICD-10-PCS | Mod: 26,,, | Performed by: RADIOLOGY

## 2023-07-13 PROCEDURE — 76830 TRANSVAGINAL US NON-OB: CPT | Mod: 26,,, | Performed by: RADIOLOGY

## 2023-07-13 PROCEDURE — 76856 US EXAM PELVIC COMPLETE: CPT | Mod: TC

## 2023-07-13 PROCEDURE — 76856 US EXAM PELVIC COMPLETE: CPT | Mod: 26,,, | Performed by: RADIOLOGY

## 2024-01-08 ENCOUNTER — OFFICE VISIT (OUTPATIENT)
Dept: PSYCHIATRY | Facility: CLINIC | Age: 24
End: 2024-01-08
Payer: COMMERCIAL

## 2024-01-08 DIAGNOSIS — F43.21 GRIEF: ICD-10-CM

## 2024-01-08 DIAGNOSIS — F32.A DEPRESSION, UNSPECIFIED DEPRESSION TYPE: ICD-10-CM

## 2024-01-08 DIAGNOSIS — F43.9 STRESS: ICD-10-CM

## 2024-01-08 DIAGNOSIS — F41.1 GAD (GENERALIZED ANXIETY DISORDER): Primary | ICD-10-CM

## 2024-01-08 PROCEDURE — 90791 PSYCH DIAGNOSTIC EVALUATION: CPT | Mod: S$GLB,,,

## 2024-01-08 NOTE — PROGRESS NOTES
"Outpatient Psychotherapy Initial Visit  2024     History of Presenting Illness:    Pt is a 24y/o Female referred by No ref. provider found for Stress/Grief.  Patient was seen, examined and chart was reviewed. Patient reviewed and agreed to informed consent and limits of confidentiality.    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 Pt reported living with 3 roommates in an off-campus house; roommates are great support system. Pt described house as calm/safe and pt reports loving to host people. Pt reported that she loves [employer] and her major but is not sure what future job/career she wants. 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      Pt reported her father  in 2022 from cancer; pt stated they "weren't that close." Pt discussed how waves of grief continue to hit her "at random times. Especially when I'm already upset about something else - then it just piles on."  Pt described herself as always an anxious person but more so since father .     Pt stated she is currently home, staying with her mom, between semesters; classes start on Wednesday January 10, 2024 and pt must register today or tomorrow for classes. Pt discussed feeling stressed as pt dad informed pt before his death that there would be money set aside in his life insurance policy to cover the rest of her tuition for college. Pt reported "this was never in writing and my step-mom has control over his life insurance policy and she is refusing to pay." Pt discussed how she is unsure how she will pay for the upcoming semester and fears it is too late to take out loans.     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     Pt reported she went to [employer], was very involved in clubs, was very independent/responsible, and was very successful; "Now, I have too many options and no one telling me what to do. Choices become overwhelming so I end up doing nothing." Pt discussed how she failed a class last semester after getting a migraine and missing the deadline to turn in the doctor's letter to the professor explaining she had a migraine; "If something is late, then I become immobilized."    Pt discussed having issues with time management and "struggling to function." Pt discussed how she holds herself to a high standard and feels she is currently a disappointment. Pt discussed struggling with having richard for self vs holding self accountable; pt discussed feeling like she is being lazy. Pt discussed having difficulties reaching out for help; "I feel people's opinion of me will change if I ask for help."    Pt described her mother as "the happiest depressed alcoholic;" pt discussed how they have a difficult relationship. Pt reported she is comfortable with setting strong boundaries except with mother: always try to please mother. Pt reported her father was also an alcoholic and her step-mom is an alcoholic. Pt stated she was close with step mom as a pre-teen but has only spoken to her once since dad  where step-mom informed pt she is dating and bought a new house.     Pt described herself as a good friend and supportive girlfriend. Pt stated she has been dating a rambo on and off since high school; he currently attends [employer] and "he is thriving. Hard for me. I compare myself to him."     Pt reported she uses journaling as a coping skill to help her figure out feelings regarding interpersonal relationships; "I tend to villainize or romanticize things." Discussed journaling about self/values/future; " "pt amenable.     Pt reported she has done 2 previous summer internships with [employer]; pt reports she wants to do an internship this summer "that's more geared to what I want." Pt encouraged to do research regarding desired internships.    Discussed challenging negative thoughts and looking at situations from multiple perspectives. Discussed Grounding Techniques to aid anxiety; pt accepted worksheet and verbalized understanding through teach back.     Pt reported she will discuss returning to college in [facility] with mother; pt reported if she does not return to school, she will contact this worker's office to make a follow up appointment. Pt informed that this worker is unable to provide telehealth sessions while pt is in [place]; pt voiced understanding.     Current symptoms:  Depression: dysphoric mood, hypersomnia, fatigue, and difficulty concentrating.  Anxiety: excessive worrying, restlessness, and muscle tension.  Sleep: non-restful sleep. Difficulty getting out of bed in the morning, many naps  Ashley:  denies.  Psychosis: denies .    Engaged in rapport building, psychoeducation, and goal setting.  Pt goals are to improve time management, improve stress management, enhance coping skills, improve communication skills. Pt would benefit from behavior modification, insight oriented, interactive, and supportive therapies.  Pt receptive to psychotherapy.     Suicidal Ideation and Risk:   Pt denied current or history of related symptoms: yes    [place]-Suicide Severity Rating Scale  In the last two weeks     1. Wish to be Dead: Have you ever wished you were dead or not alive anymore, or wish to fall asleep and not wake up?: No  2. Suicidal Thoughts: Have you had any thoughts of killing yourself?: No  3. Suicidal Thoughts with Method (withoutSpecific Plan or Intent to Act): Have you been thinking about how you might kill yourself? : No  4. Suicidal Intent (without Specific Plan): Have you had these " "thoughts and had some intention of acting on them?: No  5. Suicide Intent with Specific Plan: Have you started to work out or worked out the details of how to kill yourself? Do you intend to carry out this plan?: No  6. Suicide Behavior Question: Have you ever done anything, started to do anything, or prepare to do anything to end your life?: No  If "Yes" to question 6: How long ago did you do any of these?: Between a week and a year ago? No        Homicidal/Violent Ideation and Risk:   Pt denied current or history of related symptoms: yes      PSYCHOSOCIAL AND ENVIRONMENTAL STRESSORS:  School Stress    Clinical Assessment:   Identified symptoms to address in tx:   Anxiety  Depression    Ability to adhere to plan:  cooperative    Rationale for employing these interactive techniques: Applicable to diagnosis     Diagnosis(es):   1. JORGE (generalized anxiety disorder)        2. Depression, unspecified depression type        3. Stress        4. Grief            Plan   Treatment Goals:  Specify outcomes written in observable, behavioral terms:   Anxiety: acquiring relapse prevention skills, reducing negative automatic thoughts, reducing physical symptoms of anxiety, and reducing time spent worrying (<30 minutes/day)  Depression: acquiring relapse prevention skills, increasing energy, increasing interest in usual activities, increasing motivation, increasing self-reward for positive thoughts (one/day), reducing excessive guilt, reducing fatigue, and reducing negative automatic thoughts    Treatment Plan/Recommendations:   The treatment plan and follow up plan were reviewed with the patient.           Pt is to attend supportive psychotherapy sessions.     This author reviewed limits to confidentiality and this author's collaboration with pt's physician. Pt indicated understanding and denied any questions.    Return to Clinic: as needed  Counseling time: 60    -Call to report any worsening of symptoms or problems associated " with medication.  - Pt instructed to go to ER if thoughts of harming self or others arise.   -Supportive therapy and psychoeducation provided  -Pt instructed to call clinic, 911 or go to nearest emergency room if sxs worsen or pt is in crisis. The pt expresses understanding.     Each patient to whom he or she provides medical services by telemedicine is:  (1) informed of the relationship between the physician and patient and the respective role of any other health care provider with respect to management of the patient; and (2) notified that he or she may decline to receive medical services by telemedicine and may withdraw from such care at any time.

## 2024-01-09 ENCOUNTER — PATIENT MESSAGE (OUTPATIENT)
Dept: PSYCHIATRY | Facility: CLINIC | Age: 24
End: 2024-01-09
Payer: COMMERCIAL

## 2024-01-16 RX ORDER — OSELTAMIVIR PHOSPHATE 75 MG/1
75 CAPSULE ORAL 2 TIMES DAILY
Qty: 10 CAPSULE | Refills: 0 | Status: SHIPPED | OUTPATIENT
Start: 2024-01-16 | End: 2024-01-21

## 2024-02-14 ENCOUNTER — OFFICE VISIT (OUTPATIENT)
Dept: FAMILY MEDICINE | Facility: CLINIC | Age: 24
End: 2024-02-14
Payer: COMMERCIAL

## 2024-02-14 DIAGNOSIS — Z13.220 SCREENING FOR LIPID DISORDERS: Primary | ICD-10-CM

## 2024-02-14 DIAGNOSIS — Z13.29 SCREENING FOR THYROID DISORDER: ICD-10-CM

## 2024-02-14 DIAGNOSIS — Z13.1 SCREENING FOR DIABETES MELLITUS: ICD-10-CM

## 2024-02-14 DIAGNOSIS — Z11.59 ENCOUNTER FOR HEPATITIS C SCREENING TEST FOR LOW RISK PATIENT: ICD-10-CM

## 2024-02-14 DIAGNOSIS — F32.1 CURRENT MODERATE EPISODE OF MAJOR DEPRESSIVE DISORDER WITHOUT PRIOR EPISODE: ICD-10-CM

## 2024-02-14 PROCEDURE — 1160F RVW MEDS BY RX/DR IN RCRD: CPT | Mod: CPTII,95,, | Performed by: INTERNAL MEDICINE

## 2024-02-14 PROCEDURE — 1159F MED LIST DOCD IN RCRD: CPT | Mod: CPTII,95,, | Performed by: INTERNAL MEDICINE

## 2024-02-14 PROCEDURE — 99203 OFFICE O/P NEW LOW 30 MIN: CPT | Mod: 95,,, | Performed by: INTERNAL MEDICINE

## 2024-02-14 RX ORDER — SERTRALINE HYDROCHLORIDE 25 MG/1
25 TABLET, FILM COATED ORAL DAILY
Qty: 30 TABLET | Refills: 2 | Status: SHIPPED | OUTPATIENT
Start: 2024-02-14 | End: 2024-03-13

## 2024-02-14 NOTE — PROGRESS NOTES
Patient ID: Sophia Ann Boeckl is a 23 y.o. female.    Chief Complaint: No chief complaint on file.    Visit type: VIRTUAL    Assessment and Plan      1. Screening for lipid disorders  - Lipid Panel; Future    2. Screening for thyroid disorder  - TSH; Future    3. Screening for diabetes mellitus  - Hemoglobin A1C; Future    4. Current moderate episode of major depressive disorder without prior episode  - sertraline (ZOLOFT) 25 MG tablet; Take 1 tablet (25 mg total) by mouth once daily.  Dispense: 30 tablet; Refill: 2  - CBC Auto Differential; Future  - Comprehensive Metabolic Panel; Future    5. Encounter for hepatitis C screening test for low risk patient  - Hepatitis C Antibody; Future     Start Zoloft low-dose and will not ramp up until follow-up or by request  Follow-up in 1 month  Labs prior to appointment     HPI     Virtual today for depression and she needs primary care provider.  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  She has been struggling with anxiety and depression since her father passed away from cancer in 2022.  She is currently seeing MARKO Pedraza for counseling which has helped.  She reports some anhedonia, and at times feels unmotivated.  Some mornings she does not want to get out of bed.  She feels like she can not authentically enjoy the moment when she is trying to do something she typically enjoys.  She denies suicidality and homicidality.  We discussed options including Wellbutrin and SSRIs.  She states that she wants something for anxiety and depression and not just depression alone.     Review of Systems   Constitutional:  Negative for activity change and unexpected weight change.   HENT:  Negative for hearing loss, rhinorrhea and trouble swallowing.    Eyes:  Negative for discharge and visual disturbance.   Respiratory:  Negative for chest tightness and wheezing.    Cardiovascular:  Negative for chest pain and palpitations.    Gastrointestinal:  Negative for blood in stool, constipation, diarrhea and vomiting.   Endocrine: Negative for polyuria.   Genitourinary:  Negative for difficulty urinating, dysuria, hematuria and menstrual problem.   Musculoskeletal:  Negative for arthralgias, joint swelling and neck pain.   Neurological:  Negative for weakness and headaches.   Psychiatric/Behavioral:  Positive for dysphoric mood. Negative for confusion.      Medication List with Changes/Refills   New Medications    SERTRALINE (ZOLOFT) 25 MG TABLET    Take 1 tablet (25 mg total) by mouth once daily.   Current Medications    AMOXICILLIN-CLAVULANATE 875-125MG (AUGMENTIN) 875-125 MG PER TABLET    Take 1 tablet by mouth 2 (two) times daily.    BACITRACIN 500 UNIT/GRAM OINT    Apply topically 2 (two) times daily.    IBUPROFEN (ADVIL,MOTRIN) 600 MG TABLET    Take 1 tablet (600 mg total) by mouth 4 (four) times daily as needed for Pain.    LEVONORGESTREL (KYLEENA) 17.5 MCG/24 HRS (5 YRS) 19.5 MG IUD    1 each by Intrauterine route once.    MULTIVIT WITH CALCIUM,IRON,MIN (WOMEN'S DAILY MULTIVITAMIN ORAL)    Take by mouth once daily at 6am.    SUMATRIPTAN (IMITREX) 50 MG TABLET    Take 1 tablet (50 mg total) by mouth every 8 (eight) hours. Prn headache    UNABLE TO FIND    Take by mouth once daily at 6am. Iron supplement        The patient location is:  [place]  The chief complaint leading to consultation is:  Anxiety/depression  Face to Face time with patient:  30 minutes  minutes of total time spent on the encounter, which includes face to face time and   non-face to face time preparing to see the patient (eg, review of tests), Obtaining and/or   reviewing separately obtained history, Documenting clinical information in the electronic   or other health record, Independently interpreting results (not separately reported) and   communicating results to the patient/family/caregiver, or   Care coordination (not separately reported).     Each patient to  whom he or she provides medical services by telemedicine is:    (1) informed of the relationship between the physician and patient and the respective   role of any other health care provider with respect to management of the patient; and   (2) notified that he or she may decline to receive medical services by telemedicine and   may withdraw from such care at any time.    I personally reviewed past medical, family and social history.

## 2024-02-21 ENCOUNTER — PATIENT MESSAGE (OUTPATIENT)
Dept: PSYCHIATRY | Facility: CLINIC | Age: 24
End: 2024-02-21
Payer: COMMERCIAL

## 2024-02-23 ENCOUNTER — OFFICE VISIT (OUTPATIENT)
Dept: PSYCHIATRY | Facility: CLINIC | Age: 24
End: 2024-02-23
Payer: COMMERCIAL

## 2024-02-23 DIAGNOSIS — F33.0 MDD (MAJOR DEPRESSIVE DISORDER), RECURRENT EPISODE, MILD: ICD-10-CM

## 2024-02-23 DIAGNOSIS — F43.21 GRIEF: ICD-10-CM

## 2024-02-23 DIAGNOSIS — F41.1 GAD (GENERALIZED ANXIETY DISORDER): Primary | ICD-10-CM

## 2024-02-23 DIAGNOSIS — F43.23 ADJUSTMENT DISORDER WITH MIXED ANXIETY AND DEPRESSED MOOD: ICD-10-CM

## 2024-02-23 PROCEDURE — 90791 PSYCH DIAGNOSTIC EVALUATION: CPT | Mod: 95,,, | Performed by: SOCIAL WORKER

## 2024-02-23 NOTE — PROGRESS NOTES
"Outpatient Psychotherapy Initial Visit  2024     History of Presenting Illness:    Pt is a 23 y.o. female referred by self for  Adjustment disorders; with mixed emotional features [F43.23], Anxiety disorders; generalized anxiety disorder [F41.1], and Major Depressive Disorder, Recurrent, Mild (F33.0) .Patient was seen, examined and chart was reviewed. Patient reviewed and agreed to informed consent and limits of confidentiality. Patient was seen, examined and chart was reviewed.    Met with patient.           Pt completed initial assessment with Mark Pedraza LCSW that is pasted below for reference purposed. Reviewed hx taken in this appointment for purposes of not having pt review the same past hx that was just recorded.     Outpatient Psychotherapy Initial Visit  2024     History of Presenting Illness:    Pt is a 22y/o Female referred by No ref. provider found for Stress/Grief.  Patient was seen, examined and chart was reviewed. Patient reviewed and agreed to informed consent and limits of confidentiality.    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 Pt reported living with 3 roommates in an off-campus house; roommates are great support system. Pt described house as calm/safe and pt reports loving to host people. Pt reported that she loves [employer] and her major but is not sure what future job/career she wants. 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      Pt reported her father  in 2022 from cancer; pt stated they "weren't that close." Pt discussed how waves of grief continue to hit her "at random times. Especially when I'm already upset about something else - then it just piles on."  Pt described herself as always an anxious " "person but more so since father .     Pt stated she is currently home, staying with her mom, between semesters; classes start on Wednesday January 10, 2024 and pt must register today or tomorrow for classes. Pt discussed feeling stressed as pt dad informed pt before his death that there would be money set aside in his life insurance policy to cover the rest of her tuition for college. Pt reported "this was never in writing and my step-mom has control over his life insurance policy and she is refusing to pay." Pt discussed how she is unsure how she will pay for the upcoming semester and fears it is too late to take out loans.     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     Pt reported she went to [employer], was very involved in clubs, was very independent/responsible, and was very successful; "Now, I have too many options and no one telling me what to do. Choices become overwhelming so I end up doing nothing." Pt discussed how she failed a class last semester after getting a migraine and missing the deadline to turn in the doctor's letter to the professor explaining she had a migraine; "If something is late, then I become immobilized."    Pt discussed having issues with time management and "struggling to function." Pt discussed how she holds herself to a high standard and feels she is currently a disappointment. Pt discussed struggling with having richard for self vs holding self accountable; pt discussed feeling like she is being lazy. Pt discussed having difficulties reaching out for help; "I feel people's opinion of me will change if I ask for help."    Pt described her mother as "the happiest depressed alcoholic;" pt discussed how they have a difficult relationship. Pt reported she is comfortable with setting strong " "boundaries except with mother: always try to please mother. Pt reported her father was also an alcoholic and her step-mom is an alcoholic. Pt stated she was close with step mom as a pre-teen but has only spoken to her once since dad  where step-mom informed pt she is dating and bought a new house.     Pt described herself as a good friend and supportive girlfriend. Pt stated she has been dating a rambo on and off since high school; he currently attends [employer] and "he is thriving. Hard for me. I compare myself to him."     Pt reported she uses journaling as a coping skill to help her figure out feelings regarding interpersonal relationships; "I tend to villainize or romanticize things." Discussed journaling about self/values/future; pt amenable.     Pt reported she has done 2 previous summer internships with [employer]; pt reports she wants to do an internship this summer "that's more geared to what I want." Pt encouraged to do research regarding desired internships.    Discussed challenging negative thoughts and looking at situations from multiple perspectives. Discussed Grounding Techniques to aid anxiety; pt accepted worksheet and verbalized understanding through teach back.     Pt reported she will discuss returning to college in [place] [employer] with mother; pt reported if she does not return to school, she will contact this worker's office to make a follow up appointment. Pt informed that this worker is unable to provide telehealth sessions while pt is in [place]; pt voiced understanding.         Addendum to initial:   Pt was left life insurance money from father's passing . Step- mother was to distribute this money and "decided I did not need it so now I have loans."     Started new semester. Stated this semester is "really rough."   Not wanting to transfer.    Pt goes to school in [place]. Will conduct sessions within [place].     Main support system is her half sister on paternal side of family.     Prior to " "father's death had multiple family members pass.     Trauma hx reviewed. Denied hx of abuse. Identified that she does have times of memory losses revolved around mother's alcoholism and promiscuity.     Current symptoms:  Depression: dysphoric mood, anhedonia, hypersomnia, worthlessness/guilt, fatigue, and difficulty concentrating. Self worth issues at times present. Comparison present to others successes  Anxiety: excessive worrying and restlessness.  Sleep:  sleeping more than 10 hrs a day approx 3 times a week. .  Ashley:  denies.  Psychosis: denies .      Engaged in rapport building, psychoeducation, and goal setting.  Pt goals are to "do things that are uncomfortable for me." Avoidance has become an obstacle and overcoming avoidant behaviors is primary goal. "Improving my sense of self." "Creating a spce to approach my future." Processing complicated grief. Pt would benefit from behavior modification, insight oriented, interactive, and supportive therapies.  Pt receptive to psychotherapy. Assisted with scheduling follow ups.  Risk assessment:    Suicidal Ideation and Risk:   Pt denied current or history of related symptoms: yes    Camp-Suicide Severity Rating Scale  In the last two weeks     1. Wish to be Dead: Have you ever wished you were dead or not alive anymore, or wish to fall asleep and not wake up?: Yes  2. Suicidal Thoughts: Have you had any thoughts of killing yourself?: No  3. Suicidal Thoughts with Method (withoutSpecific Plan or Intent to Act): Have you been thinking about how you might kill yourself? : No  4. Suicidal Intent (without Specific Plan): Have you had these thoughts and had some intention of acting on them?: No  5. Suicide Intent with Specific Plan: Have you started to work out or worked out the details of how to kill yourself? Do you intend to carry out this plan?: No  6. Suicide Behavior Question: Have you ever done anything, started to do anything, or prepare to do anything to end " "your life?: No  If "Yes" to question 6: How long ago did you do any of these?: Between a week and a year ago? No        Homicidal/Violent Ideation and Risk:   Pt denied current or history of related symptoms: yes  Patient advised to call [phone] or present the the nearest ED if they experience suicidal or homicidal ideation, plan or intent.      Past Medical History:   Diagnosis Date    Deviated septum          Current Outpatient Medications:     amoxicillin-clavulanate 875-125mg (AUGMENTIN) 875-125 mg per tablet, Take 1 tablet by mouth 2 (two) times daily., Disp: 14 tablet, Rfl: 0    bacitracin 500 unit/gram Oint, Apply topically 2 (two) times daily., Disp: 30 g, Rfl: 0    ibuprofen (ADVIL,MOTRIN) 600 MG tablet, Take 1 tablet (600 mg total) by mouth 4 (four) times daily as needed for Pain., Disp: 20 tablet, Rfl: 0    levonorgestreL (KYLEENA) 17.5 mcg/24 hrs (5 yrs) 19.5 mg IUD, 1 each by Intrauterine route once., Disp: , Rfl:     multivit with calcium,iron,min (WOMEN'S DAILY MULTIVITAMIN ORAL), Take by mouth once daily at 6am., Disp: , Rfl:     sertraline (ZOLOFT) 25 MG tablet, Take 1 tablet (25 mg total) by mouth once daily., Disp: 30 tablet, Rfl: 2    sumatriptan (IMITREX) 50 MG tablet, Take 1 tablet (50 mg total) by mouth every 8 (eight) hours. Prn headache, Disp: 12 tablet, Rfl: 2    UNABLE TO FIND, Take by mouth once daily at 6am. Iron supplement, Disp: , Rfl:     Psychiatric History:    Previous Psychiatric Outpatient Treatment:  Yes -   Previous Psychiatric Hospitalizations:  No  Previous Suicide Attempts:  No  History of Trauma:  Yes  Access to a Firearm:  No    Substance Use History:  Tobacco/Nicotine:  No   Alcohol: occasional  Illicit Substances: No  Misuse of Prescription Medications:  No  Caffeine: No      Mental Status Exam:  General Appearance:  unremarkable, age appropriate   Speech: normal tone, normal rate, normal pitch, normal volume      Level of Cooperation: cooperative      Thought Processes: " normal and logical   Mood: steady      Thought Content: normal, no suicidality, no homicidality, delusions, or paranoia   Affect: congruent and appropriate   Orientation: Oriented x3   Memory: recent >  intact, remote >  intact   Attention Span & Concentration: intact   Fund of General Knowledge: intact and appropriate to age and level of education   Abstract Reasoning: interpretation of similarities was abstract, interpretation of proverbs was abstract   Judgment & Insight: good     Language intact           PSYCHOSOCIAL AND ENVIRONMENTAL STRESSORS:  Mother hx of alocholism  Father passed 2022    Clinical Assessment:   Identified symptoms to address in tx: depression   Anxiety  grief    Ability to adhere to plan:  cooperative    Rationale for employing these interactive techniques: Applicable to diagnosis     Diagnosis(es):   1. JORGE (generalized anxiety disorder)        2. MDD (major depressive disorder), recurrent episode, mild        3. Grief        4. Adjustment disorder with mixed anxiety and depressed mood              Plan   Treatment Goals:  Specify outcomes written in observable, behavioral terms:   Anxiety: modifying schemata of threat/vulnerability/need for control (or other schemata -- specify avoidance) and reducing negative automatic thoughts  Depression: increasing energy, increasing interest in usual activities, increasing motivation, increasing self-reward for positive behaviors (one/day), increasing self-reward for positive thoughts (one/day), and reducing fatigue  Grief:     Treatment Plan/Recommendations:   The treatment plan and follow up plan were reviewed with the patient.           Pt is to attend supportive psychotherapy sessions.     This author reviewed limits to confidentiality and this author's collaboration with pt's physician. Pt indicated understanding and denied any questions.    Return to Clinic: as scheduled  Counseling time: 60    -Call to report any worsening of symptoms or  problems associated with medication.  - Pt instructed to go to ER if thoughts of harming self or others arise.   -Supportive therapy and psychoeducation provided  -Pt instructed to call clinic, 911 or go to nearest emergency room if sxs worsen or pt is in crisis. The pt expresses understanding.     Each patient to whom he or she provides medical services by telemedicine is:  (1) informed of the relationship between the physician and patient and the respective role of any other health care provider with respect to management of the patient; and (2) notified that he or she may decline to receive medical services by telemedicine and may withdraw from such care at any time.

## 2024-03-08 ENCOUNTER — PATIENT MESSAGE (OUTPATIENT)
Dept: PSYCHIATRY | Facility: CLINIC | Age: 24
End: 2024-03-08
Payer: COMMERCIAL

## 2024-03-12 ENCOUNTER — OFFICE VISIT (OUTPATIENT)
Dept: OBSTETRICS AND GYNECOLOGY | Facility: CLINIC | Age: 24
End: 2024-03-12
Payer: COMMERCIAL

## 2024-03-12 ENCOUNTER — OFFICE VISIT (OUTPATIENT)
Dept: PSYCHIATRY | Facility: CLINIC | Age: 24
End: 2024-03-12
Payer: COMMERCIAL

## 2024-03-12 VITALS
BODY MASS INDEX: 24.09 KG/M2 | HEIGHT: 68 IN | RESPIRATION RATE: 18 BRPM | DIASTOLIC BLOOD PRESSURE: 72 MMHG | WEIGHT: 158.94 LBS | SYSTOLIC BLOOD PRESSURE: 114 MMHG

## 2024-03-12 DIAGNOSIS — F43.23 ADJUSTMENT DISORDER WITH MIXED ANXIETY AND DEPRESSED MOOD: ICD-10-CM

## 2024-03-12 DIAGNOSIS — Z01.419 ROUTINE GYNECOLOGICAL EXAMINATION: Primary | ICD-10-CM

## 2024-03-12 DIAGNOSIS — T83.32XD INTRAUTERINE CONTRACEPTIVE DEVICE THREADS LOST, SUBSEQUENT ENCOUNTER: ICD-10-CM

## 2024-03-12 DIAGNOSIS — N89.8 VAGINAL DISCHARGE: ICD-10-CM

## 2024-03-12 DIAGNOSIS — F41.1 GAD (GENERALIZED ANXIETY DISORDER): Primary | ICD-10-CM

## 2024-03-12 DIAGNOSIS — Z11.3 SCREENING EXAMINATION FOR STD (SEXUALLY TRANSMITTED DISEASE): ICD-10-CM

## 2024-03-12 PROCEDURE — 99395 PREV VISIT EST AGE 18-39: CPT | Mod: S$GLB,,, | Performed by: OBSTETRICS & GYNECOLOGY

## 2024-03-12 PROCEDURE — 3074F SYST BP LT 130 MM HG: CPT | Mod: CPTII,S$GLB,, | Performed by: OBSTETRICS & GYNECOLOGY

## 2024-03-12 PROCEDURE — 81514 NFCT DS BV&VAGINITIS DNA ALG: CPT | Performed by: OBSTETRICS & GYNECOLOGY

## 2024-03-12 PROCEDURE — 99999 PR PBB SHADOW E&M-EST. PATIENT-LVL III: CPT | Mod: PBBFAC,,, | Performed by: OBSTETRICS & GYNECOLOGY

## 2024-03-12 PROCEDURE — 87625 HPV TYPES 16 & 18 ONLY: CPT | Performed by: OBSTETRICS & GYNECOLOGY

## 2024-03-12 PROCEDURE — 3008F BODY MASS INDEX DOCD: CPT | Mod: CPTII,S$GLB,, | Performed by: OBSTETRICS & GYNECOLOGY

## 2024-03-12 PROCEDURE — 87624 HPV HI-RISK TYP POOLED RSLT: CPT | Performed by: OBSTETRICS & GYNECOLOGY

## 2024-03-12 PROCEDURE — 87491 CHLMYD TRACH DNA AMP PROBE: CPT | Performed by: OBSTETRICS & GYNECOLOGY

## 2024-03-12 PROCEDURE — 88175 CYTOPATH C/V AUTO FLUID REDO: CPT | Performed by: OBSTETRICS & GYNECOLOGY

## 2024-03-12 PROCEDURE — 1159F MED LIST DOCD IN RCRD: CPT | Mod: CPTII,S$GLB,, | Performed by: OBSTETRICS & GYNECOLOGY

## 2024-03-12 PROCEDURE — 3078F DIAST BP <80 MM HG: CPT | Mod: CPTII,S$GLB,, | Performed by: OBSTETRICS & GYNECOLOGY

## 2024-03-12 PROCEDURE — 90832 PSYTX W PT 30 MINUTES: CPT | Mod: S$GLB,,, | Performed by: SOCIAL WORKER

## 2024-03-12 PROCEDURE — 99999 PR PBB SHADOW E&M-EST. PATIENT-LVL I: CPT | Mod: PBBFAC,,, | Performed by: SOCIAL WORKER

## 2024-03-12 NOTE — PROGRESS NOTES
Chief Complaint   Patient presents with    Well Woman       History of Present Illness: Sophia Ann Boeckl is a 23 y.o. female that presents today 3/12/2024 with Patient's last menstrual period was 02/13/2024 (exact date).  for well gyn visit.    Past Medical History:   Diagnosis Date    Deviated septum        Past Surgical History:   Procedure Laterality Date    ADENOIDECTOMY      APPLICATION OF CARTILAGE GRAFT N/A 8/3/2022    Procedure: APPLICATION, CARTILAGE GRAFT;  Surgeon: Lamine Culp MD;  Location: [facility] OR;  Service: ENT;  Laterality: N/A;  FROM right  EAR TO NOSE    NASAL STENOSIS REPAIR N/A 8/3/2022    Procedure: REPAIR, NOSE, VALVE;  Surgeon: Lamine Culp MD;  Location: [facility] OR;  Service: ENT;  Laterality: N/A;    NASAL TURBINATE REDUCTION Bilateral 2/10/2021    Procedure: REDUCTION, NASAL TURBINATE;  Surgeon: Lamine Culp MD;  Location: [facility] OR;  Service: ENT;  Laterality: Bilateral;    RHINOPLASTY N/A 2/10/2021    Procedure: SEPTORHINOPLASTY;  Surgeon: Lamine Culp MD;  Location: [facility] OR;  Service: ENT;  Laterality: N/A;    TONSILLECTOMY      WISDOM TOOTH EXTRACTION         Outpatient Medications Prior to Visit   Medication Sig Dispense Refill    amoxicillin-clavulanate 875-125mg (AUGMENTIN) 875-125 mg per tablet Take 1 tablet by mouth 2 (two) times daily. 14 tablet 0    bacitracin 500 unit/gram Oint Apply topically 2 (two) times daily. 30 g 0    ibuprofen (ADVIL,MOTRIN) 600 MG tablet Take 1 tablet (600 mg total) by mouth 4 (four) times daily as needed for Pain. 20 tablet 0    levonorgestreL (KYLEENA) 17.5 mcg/24 hrs (5 yrs) 19.5 mg IUD 1 each by Intrauterine route once.      multivit with calcium,iron,min (WOMEN'S DAILY MULTIVITAMIN ORAL) Take by mouth once daily at 6am.      sertraline (ZOLOFT) 25 MG tablet Take 1 tablet (25 mg total) by mouth once daily. 30 tablet 2    sumatriptan (IMITREX) 50 MG tablet Take 1 tablet (50 mg total) by mouth every 8 (eight) hours. Prn headache 12 tablet 2    UNABLE  TO FIND Take by mouth once daily at 6am. Iron supplement       No facility-administered medications prior to visit.       Review of patient's allergies indicates:  No Known Allergies    Family History   Problem Relation Age of Onset    No Known Problems Mother     Amblyopia Father     Kidney cancer Father     Rhinitis Maternal Grandmother     Cataracts Maternal Grandfather     Retinal detachment Maternal Aunt     Breast cancer Paternal Aunt     Breast cancer Paternal Aunt     Urticaria Neg Hx     Immunodeficiency Neg Hx     Eczema Neg Hx     Atopy Neg Hx     Asthma Neg Hx     Angioedema Neg Hx     Allergies Neg Hx     Allergic rhinitis Neg Hx     Ovarian cancer Neg Hx        Social History     Socioeconomic History    Marital status: Single   Tobacco Use    Smoking status: Never    Smokeless tobacco: Never   Substance and Sexual Activity    Alcohol use: Not Currently    Drug use: Never    Sexual activity: Not Currently   Social History Narrative    No smokers, 2 dos and a bird     Social Determinants of Health     Financial Resource Strain: Low Risk  (2/14/2024)    Overall Financial Resource Strain (CARDIA)     Difficulty of Paying Living Expenses: Not hard at all   Food Insecurity: No Food Insecurity (2/14/2024)    Hunger Vital Sign     Worried About Running Out of Food in the Last Year: Never true     Ran Out of Food in the Last Year: Never true   Transportation Needs: Unmet Transportation Needs (2/14/2024)    PRAPARE - Transportation     Lack of Transportation (Medical): Yes     Lack of Transportation (Non-Medical): No   Physical Activity: Sufficiently Active (2/14/2024)    Exercise Vital Sign     Days of Exercise per Week: 5 days     Minutes of Exercise per Session: 30 min   Stress: Stress Concern Present (2/14/2024)    [employer] - Occupational Stress Questionnaire     Feeling of Stress : Very much   Social Connections: Unknown (2/14/2024)    Social Connection and Isolation Panel  "[NHANES]     Frequency of Communication with Friends and Family: More than three times a week     Frequency of Social Gatherings with Friends and Family: More than three times a week     Active Member of Clubs or Organizations: Yes     Attends Club or Organization Meetings: More than 4 times per year     Marital Status: Patient declined   Housing Stability: Low Risk  (2/14/2024)    Housing Stability Vital Sign     Unable to Pay for Housing in the Last Year: No     Number of Places Lived in the Last Year: 2     Unstable Housing in the Last Year: No       OB History   No obstetric history on file.       Review of Symptoms:  GENERAL: Denies weight gain or weight loss. Feeling well overall.   SKIN: Denies rash or lesions.   HEAD: Denies head injury or headache.   NODES: Denies enlarged lymph nodes.   CHEST: Denies chest pain or shortness of breath.   CARDIOVASCULAR: Denies palpitations or left sided chest pain.   ABDOMEN: No abdominal pain, constipation, diarrhea, nausea, vomiting or rectal bleeding.   URINARY: No frequency, dysuria, hematuria, or burning on urination.  HEMATOLOGIC: No easy bruisability or excessive bleeding.   MUSCULOSKELETAL: Denies joint pain or swelling.     /72   Resp 18   Ht 5' 8" (1.727 m)   Wt 72.1 kg (158 lb 15.2 oz)   LMP 02/13/2024 (Exact Date)   Physical Exam:  APPEARANCE: Well nourished, well developed, in no acute distress.  SKIN: Normal skin turgor, no lesions.  NECK: Neck symmetric without masses   RESPIRATORY: Normal respiratory effort with no retractions or use of accessory muscles  CARDIOVASCULAR: Peripheral vascular system with no swelling no varicosities and palpation of pulses normal  LYMPHATIC: No enlargements of the lymph nodes noted in the neck, axillae, or groin  ABDOMEN: Soft. No tenderness or masses. No hepatosplenomegaly. No hernias.  BREASTS: Symmetrical, no skin changes or visible lesions. No palpable masses, nipple discharge or adenopathy bilaterally.  PELVIC: " Normal external female genitalia without lesions. Normal hair distribution. Adequate perineal body, normal urethral meatus. Urethra with no masses.  Bladder nontender. Vagina moist and well rugated without lesions or discharge. Cervix pink and without lesions. No significant cystocele or rectocele. Bimanual exam showed uterus normal size, shape, position, mobile and nontender. Adnexa without masses or tenderness. Urethra and bladder normal.   EXTREMITIES: No clubbing cyanosis or edema.    ASSESSMENT/PLAN:  Routine gynecological examination  -     Liquid-Based Pap Smear, Screening    Vaginal discharge  -     Vaginosis Screen by DNA Probe    Screening examination for STD (sexually transmitted disease)  -     C. trachomatis/N. gonorrhoeae by AMP DNA    Intrauterine contraceptive device threads lost, subsequent encounter  -     US Pelvis Limited_IUD Placement or Check; Future; Expected date: 03/12/2024          Patient was counseled today on Pelvic exams and Pap Smear guidelines.   We discussed STD screening if at high risk for a STD.  We discussed recommendation for breast cancer screening with mammogram every other year after the age of 40 and annually after the age of 50.    We discussed colon cancer screening when indicated.   Osteoporosis screening discussed when indicated.   She was advised to see her primary care physician for all other health maintenance.     FOLLOW-UP with me for next routine visit.

## 2024-03-12 NOTE — PROGRESS NOTES
"Individual Psychotherapy (PhD/LCSW)    03/12/2024    Interim Events/Subjective Report/Content of Current Session:  follow-up appointment.    Pt is a 23 y.o. female with past psychiatric hx of  adjustment do and lucia who presents for follow-up treatment.  Zoloft was prescribed one month ago. Has made pt more irritable per pt report. PCP appointment tomorrow to assess new meds possible.   Pt goals are to "do things that are uncomfortable for me." Avoidance has become an obstacle and overcoming avoidant behaviors is primary goal. "Improving my sense of self." "Creating a spce to approach my future." Processing complicated grief.  Reviewed goals with pt today.   Pt identified she is focused on future goals. Has applied to internships. Focused on allowing more time spent thinking about future goals.  Pt identified having a difficult time changing thought process from high school and needing straight A's to being in college and "needing to pass".  Pt depicted times in school that she turned things in on purpose late to self sabotage and not get an A bc it was late vs not get an A because it was  bad per pt.   Explored this thought process. Focused on need to self sabotage.   Provided insight on cognitive distortions and comparison towards others. Pt expressed expectations for herself that tend to be of high standards and high expectations that are not always met which form obstacles in day to day. Pt reported that she has a main regret of going to [employer]. Chose to not apply to closer universities nearby that would have been a full scholarship. Identified this caused financial hardship that she is regretful for as well as feeling guilty.   Not able to transfer. Reported inability to forgive herself for making this decision.   Processed  forgiveness as well as grief reaction.   Pt and sw to continue to process feelings towards her decisions in the past next session.     Current symptoms:  Depression: dysphoric mood " and difficulty concentrating.  Anxiety: excessive worrying and restlessness.  Sleep:  denied .  Ashley:  denies.  Psychosis: denies .    Will continue to follow.   Pt aware to contact sw for any additional needs that may occur prior to next session.  Therapeutic Intervention/Techniques: behavior modification, insight oriented, interactive, and supportive; relevant to diagnosis, patient responds to this modality    Risk Parameters:  Patient reports no suicidal ideation  Patient reports no homicidal ideation  Patient reports no self-injurious behavior  Patient reports no violent behavior    Diagnosis:   1. JORGE (generalized anxiety disorder)        2. Adjustment disorder with mixed anxiety and depressed mood              Return to Clinic: as scheduled  Counseling time: 30  -Call to report any worsening of symptoms or problems associated with medication.  - Pt instructed to go to ER if thoughts of harming self or others arise.   -Supportive therapy and psychoeducation provided  -Pt instructed to call clinic, 911 or go to nearest emergency room if sxs worsen or pt is in crisis. The pt expresses understanding.   Each patient to whom he or she provides medical services by telemedicine is:  (1) informed of the relationship between the physician and patient and the respective role of any other health care provider with respect to management of the patient; and (2) notified that he or she may decline to receive medical services by telemedicine and may withdraw from such care at any time.

## 2024-03-13 ENCOUNTER — LAB VISIT (OUTPATIENT)
Dept: LAB | Facility: HOSPITAL | Age: 24
End: 2024-03-13
Payer: COMMERCIAL

## 2024-03-13 ENCOUNTER — OFFICE VISIT (OUTPATIENT)
Dept: FAMILY MEDICINE | Facility: CLINIC | Age: 24
End: 2024-03-13
Payer: COMMERCIAL

## 2024-03-13 VITALS
HEIGHT: 68 IN | WEIGHT: 160.5 LBS | OXYGEN SATURATION: 99 % | SYSTOLIC BLOOD PRESSURE: 118 MMHG | BODY MASS INDEX: 24.32 KG/M2 | DIASTOLIC BLOOD PRESSURE: 70 MMHG | HEART RATE: 82 BPM

## 2024-03-13 DIAGNOSIS — Z12.83 SKIN CANCER SCREENING: ICD-10-CM

## 2024-03-13 DIAGNOSIS — F32.1 CURRENT MODERATE EPISODE OF MAJOR DEPRESSIVE DISORDER WITHOUT PRIOR EPISODE: Primary | ICD-10-CM

## 2024-03-13 DIAGNOSIS — Z13.1 SCREENING FOR DIABETES MELLITUS: ICD-10-CM

## 2024-03-13 DIAGNOSIS — F32.1 CURRENT MODERATE EPISODE OF MAJOR DEPRESSIVE DISORDER WITHOUT PRIOR EPISODE: ICD-10-CM

## 2024-03-13 DIAGNOSIS — Z13.29 SCREENING FOR THYROID DISORDER: ICD-10-CM

## 2024-03-13 DIAGNOSIS — T43.225A: ICD-10-CM

## 2024-03-13 DIAGNOSIS — Z11.59 ENCOUNTER FOR HEPATITIS C SCREENING TEST FOR LOW RISK PATIENT: ICD-10-CM

## 2024-03-13 DIAGNOSIS — Z13.220 SCREENING FOR LIPID DISORDERS: ICD-10-CM

## 2024-03-13 LAB
BACTERIAL VAGINOSIS DNA: NEGATIVE
BASOPHILS # BLD AUTO: 0.06 K/UL (ref 0–0.2)
BASOPHILS NFR BLD: 0.9 % (ref 0–1.9)
C TRACH DNA SPEC QL NAA+PROBE: NOT DETECTED
CANDIDA GLABRATA DNA: NEGATIVE
CANDIDA KRUSEI DNA: NEGATIVE
CANDIDA RRNA VAG QL PROBE: NEGATIVE
DIFFERENTIAL METHOD BLD: ABNORMAL
EOSINOPHIL # BLD AUTO: 0.1 K/UL (ref 0–0.5)
EOSINOPHIL NFR BLD: 2 % (ref 0–8)
ERYTHROCYTE [DISTWIDTH] IN BLOOD BY AUTOMATED COUNT: 12.1 % (ref 11.5–14.5)
ESTIMATED AVG GLUCOSE: 82 MG/DL (ref 68–131)
HBA1C MFR BLD: 4.5 % (ref 4–5.6)
HCT VFR BLD AUTO: 45.2 % (ref 37–48.5)
HGB BLD-MCNC: 15.4 G/DL (ref 12–16)
IMM GRANULOCYTES # BLD AUTO: 0.01 K/UL (ref 0–0.04)
IMM GRANULOCYTES NFR BLD AUTO: 0.2 % (ref 0–0.5)
LYMPHOCYTES # BLD AUTO: 2 K/UL (ref 1–4.8)
LYMPHOCYTES NFR BLD: 29.9 % (ref 18–48)
MCH RBC QN AUTO: 32.1 PG (ref 27–31)
MCHC RBC AUTO-ENTMCNC: 34.1 G/DL (ref 32–36)
MCV RBC AUTO: 94 FL (ref 82–98)
MONOCYTES # BLD AUTO: 0.4 K/UL (ref 0.3–1)
MONOCYTES NFR BLD: 5.7 % (ref 4–15)
N GONORRHOEA DNA SPEC QL NAA+PROBE: NOT DETECTED
NEUTROPHILS # BLD AUTO: 4 K/UL (ref 1.8–7.7)
NEUTROPHILS NFR BLD: 61.3 % (ref 38–73)
NRBC BLD-RTO: 0 /100 WBC
PLATELET # BLD AUTO: 199 K/UL (ref 150–450)
PMV BLD AUTO: 10.4 FL (ref 9.2–12.9)
RBC # BLD AUTO: 4.8 M/UL (ref 4–5.4)
T VAGINALIS RRNA GENITAL QL PROBE: NEGATIVE
WBC # BLD AUTO: 6.52 K/UL (ref 3.9–12.7)

## 2024-03-13 PROCEDURE — 36415 COLL VENOUS BLD VENIPUNCTURE: CPT | Mod: PO | Performed by: INTERNAL MEDICINE

## 2024-03-13 PROCEDURE — 3008F BODY MASS INDEX DOCD: CPT | Mod: CPTII,S$GLB,, | Performed by: INTERNAL MEDICINE

## 2024-03-13 PROCEDURE — 80053 COMPREHEN METABOLIC PANEL: CPT | Performed by: INTERNAL MEDICINE

## 2024-03-13 PROCEDURE — 80061 LIPID PANEL: CPT | Performed by: INTERNAL MEDICINE

## 2024-03-13 PROCEDURE — 84443 ASSAY THYROID STIM HORMONE: CPT | Performed by: INTERNAL MEDICINE

## 2024-03-13 PROCEDURE — 3078F DIAST BP <80 MM HG: CPT | Mod: CPTII,S$GLB,, | Performed by: INTERNAL MEDICINE

## 2024-03-13 PROCEDURE — 85025 COMPLETE CBC W/AUTO DIFF WBC: CPT | Performed by: INTERNAL MEDICINE

## 2024-03-13 PROCEDURE — 99214 OFFICE O/P EST MOD 30 MIN: CPT | Mod: S$GLB,,, | Performed by: INTERNAL MEDICINE

## 2024-03-13 PROCEDURE — 3074F SYST BP LT 130 MM HG: CPT | Mod: CPTII,S$GLB,, | Performed by: INTERNAL MEDICINE

## 2024-03-13 PROCEDURE — 1159F MED LIST DOCD IN RCRD: CPT | Mod: CPTII,S$GLB,, | Performed by: INTERNAL MEDICINE

## 2024-03-13 PROCEDURE — 99999 PR PBB SHADOW E&M-EST. PATIENT-LVL IV: CPT | Mod: PBBFAC,,, | Performed by: INTERNAL MEDICINE

## 2024-03-13 PROCEDURE — 86803 HEPATITIS C AB TEST: CPT | Performed by: INTERNAL MEDICINE

## 2024-03-13 PROCEDURE — 1160F RVW MEDS BY RX/DR IN RCRD: CPT | Mod: CPTII,S$GLB,, | Performed by: INTERNAL MEDICINE

## 2024-03-13 PROCEDURE — 83036 HEMOGLOBIN GLYCOSYLATED A1C: CPT | Performed by: INTERNAL MEDICINE

## 2024-03-13 RX ORDER — SERTRALINE HYDROCHLORIDE 50 MG/1
50 TABLET, FILM COATED ORAL DAILY
Qty: 30 TABLET | Refills: 2 | Status: SHIPPED | OUTPATIENT
Start: 2024-03-13 | End: 2024-05-09

## 2024-03-13 NOTE — PROGRESS NOTES
Patient ID: Sophia Ann Boeckl is a 23 y.o. female.    Chief Complaint: Annual Exam      Assessment / Plan      1. Current moderate episode of major depressive disorder without prior episode  - PHARMACOGENOMICS PANEL; Future  - sertraline (ZOLOFT) 50 MG tablet; Take 1 tablet (50 mg total) by mouth once daily.  Dispense: 30 tablet; Refill: 2    2. Adverse effect of sertraline, initial encounter  - PHARMACOGENOMICS PANEL; Future    3. Skin cancer screening  - Ambulatory referral/consult to Dermatology; Future    Labs pending   Increase Zoloft to 50 mg   Follow-up early April  Dermatology for a skin cancer screening  See about getting pharmacogenetic panel     HPI     Annual     Medications, recent labs, health maintenance, and diet has been reviewed.    Exercise- climbing   Alcohol- 2-3 drinks/ week   Tobacco- none     Started her on Zoloft one-month ago.  We kept her on 25 mg.  Still tired and more on edge. Anxiety/ depression has not changed.  We discussed options.  She would like to do a pharmacokinetics panel which will try to get.     Review of Systems   Constitutional:  Negative for fever.   Respiratory:  Negative for shortness of breath.    Cardiovascular:  Negative for chest pain.   Gastrointestinal:  Negative for abdominal pain.        Objective     Vitals:    03/13/24 1525   BP: 118/70   Pulse: 82      Wt Readings from Last 3 Encounters:   03/13/24 1525 72.8 kg (160 lb 7.9 oz)   03/12/24 1317 72.1 kg (158 lb 15.2 oz)   02/04/24 0432 72.8 kg (160 lb 9.6 oz)      Body mass index is 24.4 kg/m².     Physical Exam  Cardiovascular:      Rate and Rhythm: Normal rate and regular rhythm.      Heart sounds: No murmur heard.     No gallop.   Pulmonary:      Breath sounds: Normal breath sounds. No wheezing or rhonchi.   Abdominal:      Palpations: Abdomen is soft.      Tenderness: There is no abdominal tenderness.                  Medication List with Changes/Refills   New Medications    SERTRALINE (ZOLOFT) 50 MG  TABLET    Take 1 tablet (50 mg total) by mouth once daily.   Current Medications    AMOXICILLIN-CLAVULANATE 875-125MG (AUGMENTIN) 875-125 MG PER TABLET    Take 1 tablet by mouth 2 (two) times daily.    BACITRACIN 500 UNIT/GRAM OINT    Apply topically 2 (two) times daily.    IBUPROFEN (ADVIL,MOTRIN) 600 MG TABLET    Take 1 tablet (600 mg total) by mouth 4 (four) times daily as needed for Pain.    LEVONORGESTREL (KYLEENA) 17.5 MCG/24 HRS (5 YRS) 19.5 MG IUD    1 each by Intrauterine route once.    MULTIVIT WITH CALCIUM,IRON,MIN (WOMEN'S DAILY MULTIVITAMIN ORAL)    Take by mouth once daily at 6am.    SUMATRIPTAN (IMITREX) 50 MG TABLET    Take 1 tablet (50 mg total) by mouth every 8 (eight) hours. Prn headache    UNABLE TO FIND    Take by mouth once daily at 6am. Iron supplement   Discontinued Medications    SERTRALINE (ZOLOFT) 25 MG TABLET    Take 1 tablet (25 mg total) by mouth once daily.       I personally reviewed past medical, family and social history.

## 2024-03-14 LAB
ALBUMIN SERPL BCP-MCNC: 4.5 G/DL (ref 3.5–5.2)
ALP SERPL-CCNC: 78 U/L (ref 55–135)
ALT SERPL W/O P-5'-P-CCNC: 13 U/L (ref 10–44)
ANION GAP SERPL CALC-SCNC: 12 MMOL/L (ref 8–16)
AST SERPL-CCNC: 16 U/L (ref 10–40)
BILIRUB SERPL-MCNC: 0.7 MG/DL (ref 0.1–1)
BUN SERPL-MCNC: 7 MG/DL (ref 6–20)
CALCIUM SERPL-MCNC: 10 MG/DL (ref 8.7–10.5)
CHLORIDE SERPL-SCNC: 106 MMOL/L (ref 95–110)
CHOLEST SERPL-MCNC: 155 MG/DL (ref 120–199)
CHOLEST/HDLC SERPL: 3 {RATIO} (ref 2–5)
CO2 SERPL-SCNC: 21 MMOL/L (ref 23–29)
CREAT SERPL-MCNC: 0.8 MG/DL (ref 0.5–1.4)
EST. GFR  (NO RACE VARIABLE): >60 ML/MIN/1.73 M^2
GLUCOSE SERPL-MCNC: 93 MG/DL (ref 70–110)
HCV AB SERPL QL IA: NORMAL
HDLC SERPL-MCNC: 52 MG/DL (ref 40–75)
HDLC SERPL: 33.5 % (ref 20–50)
LDLC SERPL CALC-MCNC: 93.8 MG/DL (ref 63–159)
NONHDLC SERPL-MCNC: 103 MG/DL
POTASSIUM SERPL-SCNC: 4 MMOL/L (ref 3.5–5.1)
PROT SERPL-MCNC: 8 G/DL (ref 6–8.4)
SODIUM SERPL-SCNC: 139 MMOL/L (ref 136–145)
TRIGL SERPL-MCNC: 46 MG/DL (ref 30–150)
TSH SERPL DL<=0.005 MIU/L-ACNC: 0.85 UIU/ML (ref 0.4–4)

## 2024-03-18 ENCOUNTER — PATIENT MESSAGE (OUTPATIENT)
Dept: PSYCHIATRY | Facility: CLINIC | Age: 24
End: 2024-03-18

## 2024-03-18 ENCOUNTER — OFFICE VISIT (OUTPATIENT)
Dept: PSYCHIATRY | Facility: CLINIC | Age: 24
End: 2024-03-18
Payer: COMMERCIAL

## 2024-03-18 DIAGNOSIS — F33.0 MDD (MAJOR DEPRESSIVE DISORDER), RECURRENT EPISODE, MILD: ICD-10-CM

## 2024-03-18 DIAGNOSIS — F43.23 ADJUSTMENT DISORDER WITH MIXED ANXIETY AND DEPRESSED MOOD: ICD-10-CM

## 2024-03-18 DIAGNOSIS — F41.1 GAD (GENERALIZED ANXIETY DISORDER): Primary | ICD-10-CM

## 2024-03-18 PROCEDURE — 90834 PSYTX W PT 45 MINUTES: CPT | Mod: 95,,, | Performed by: SOCIAL WORKER

## 2024-03-18 PROCEDURE — 3044F HG A1C LEVEL LT 7.0%: CPT | Mod: CPTII,95,, | Performed by: SOCIAL WORKER

## 2024-03-18 NOTE — PROGRESS NOTES
Individual Psychotherapy (PhD/LCSW)    03/18/2024    Interim Events/Subjective Report/Content of Current Session:  follow-up appointment.    Pt is a 23 y.o. female with past psychiatric hx of  adjustment do and lucia who presents for follow-up treatment.  Pt and sw to continue to process feelings towards her decisions in the past next session. Going back to school today after session and wanting to continue to focus on forgiveness and grief within making decision to stay at [employer] [place].   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 Causes pt to have avoidance and no longer spending time on the campus. Provided challenging negative thinking and anxious thought techniques within CBT.   Staircase technique provided as a tool to assist pt in identifying achievable goals as well as steps leading up to accomplishing those goals on a realistic basis.       Current symptoms:  Depression: dysphoric mood and difficulty concentrating.  Anxiety: excessive worrying and restlessness.  Sleep:  denied .  Ashley:  denies.  Psychosis: denies .    Will continue to follow. Will assess staircase next session to assess ability to apply technique and effectiveness in technique  Pt aware to contact sw for any additional needs that may occur prior to next session.  Therapeutic Intervention/Techniques: behavior modification, insight oriented, interactive, and supportive; relevant to diagnosis, patient responds to this modality    Risk Parameters:  Patient reports no suicidal ideation  Patient reports no homicidal ideation  Patient reports no self-injurious behavior  Patient reports no violent behavior    Diagnosis:   1. LUCIA (generalized anxiety disorder)        2. Adjustment disorder with mixed anxiety and depressed mood        3. MDD (major depressive disorder), recurrent episode, mild                Return to Clinic: as scheduled  Counseling time: 30  -Call to report any worsening of symptoms or  problems associated with medication.  - Pt instructed to go to ER if thoughts of harming self or others arise.   -Supportive therapy and psychoeducation provided  -Pt instructed to call clinic, 911 or go to nearest emergency room if sxs worsen or pt is in crisis. The pt expresses understanding.   Each patient to whom he or she provides medical services by telemedicine is:  (1) informed of the relationship between the physician and patient and the respective role of any other health care provider with respect to management of the patient; and (2) notified that he or she may decline to receive medical services by telemedicine and may withdraw from such care at any time.

## 2024-03-26 LAB
CLINICAL INFO: ABNORMAL
CYTO CVX: ABNORMAL
CYTOLOGIST CVX/VAG CYTO: ABNORMAL
CYTOLOGIST CVX/VAG CYTO: ABNORMAL
CYTOLOGY CMNT CVX/VAG CYTO-IMP: ABNORMAL
CYTOLOGY PAP THIN PREP EXPLANATION: ABNORMAL
DATE OF PREVIOUS PAP: ABNORMAL
DATE PREVIOUS BX: ABNORMAL
GEN CATEG CVX/VAG CYTO-IMP: ABNORMAL
HPV I/H RISK 4 DNA CVX QL NAA+PROBE: DETECTED
HPV16 DNA CVX QL PROBE+SIG AMP: NOT DETECTED
HPV18 DNA CVX QL PROBE+SIG AMP: NOT DETECTED
LMP START DATE: ABNORMAL
MICROORGANISM CVX/VAG CYTO: ABNORMAL
PATHOLOGIST CVX/VAG CYTO: ABNORMAL
SERVICE CMNT-IMP: ABNORMAL
SPECIMEN SOURCE CVX/VAG CYTO: ABNORMAL
STAT OF ADQ CVX/VAG CYTO-IMP: ABNORMAL

## 2024-04-03 ENCOUNTER — PATIENT MESSAGE (OUTPATIENT)
Dept: OBSTETRICS AND GYNECOLOGY | Facility: CLINIC | Age: 24
End: 2024-04-03
Payer: COMMERCIAL

## 2024-04-05 ENCOUNTER — LAB VISIT (OUTPATIENT)
Dept: LAB | Facility: HOSPITAL | Age: 24
End: 2024-04-05
Attending: INTERNAL MEDICINE
Payer: COMMERCIAL

## 2024-04-05 DIAGNOSIS — F32.1 CURRENT MODERATE EPISODE OF MAJOR DEPRESSIVE DISORDER WITHOUT PRIOR EPISODE: ICD-10-CM

## 2024-04-05 DIAGNOSIS — T43.225A: ICD-10-CM

## 2024-04-05 PROCEDURE — 36415 COLL VENOUS BLD VENIPUNCTURE: CPT | Mod: PO | Performed by: INTERNAL MEDICINE

## 2024-04-11 ENCOUNTER — PATIENT MESSAGE (OUTPATIENT)
Dept: PSYCHIATRY | Facility: CLINIC | Age: 24
End: 2024-04-11
Payer: COMMERCIAL

## 2024-04-15 LAB
ONEOME COMMENT: NORMAL
ONEOME METHOD: NORMAL

## 2024-04-17 ENCOUNTER — ON-DEMAND VIRTUAL (OUTPATIENT)
Dept: URGENT CARE | Facility: CLINIC | Age: 24
End: 2024-04-17
Payer: COMMERCIAL

## 2024-04-17 ENCOUNTER — PATIENT MESSAGE (OUTPATIENT)
Dept: PSYCHIATRY | Facility: CLINIC | Age: 24
End: 2024-04-17
Payer: COMMERCIAL

## 2024-04-17 ENCOUNTER — E-VISIT (OUTPATIENT)
Dept: FAMILY MEDICINE | Facility: CLINIC | Age: 24
End: 2024-04-17
Payer: COMMERCIAL

## 2024-04-17 DIAGNOSIS — K12.0 RECURRENT APHTHOUS STOMATITIS: Primary | ICD-10-CM

## 2024-04-17 DIAGNOSIS — K12.1 MOUTH ULCER: Primary | ICD-10-CM

## 2024-04-17 PROCEDURE — 99422 OL DIG E/M SVC 11-20 MIN: CPT | Mod: ,,, | Performed by: INTERNAL MEDICINE

## 2024-04-17 PROCEDURE — 99212 OFFICE O/P EST SF 10 MIN: CPT | Mod: CC,95,, | Performed by: NURSE PRACTITIONER

## 2024-04-17 RX ORDER — DEXAMETHASONE 0.5 MG/5ML
1 ELIXIR ORAL EVERY 8 HOURS
Qty: 300 ML | Refills: 0 | Status: SHIPPED | OUTPATIENT
Start: 2024-04-17 | End: 2024-04-27

## 2024-04-17 RX ORDER — DEXAMETHASONE 0.5 MG/5ML
1 ELIXIR ORAL EVERY 8 HOURS
Qty: 300 ML | Refills: 0 | Status: SHIPPED | OUTPATIENT
Start: 2024-04-17 | End: 2024-04-17

## 2024-04-17 NOTE — PROGRESS NOTES
Subjective:      Patient ID: Sophia Ann Boeckl is a 23 y.o. female.    Vitals:  vitals were not taken for this visit.     Chief Complaint: Mouth Lesions      Visit Type: TELE AUDIOVISUAL    Present with the patient at the time of consultation: TELEMED PRESENT WITH PATIENT: None        Past Medical History:   Diagnosis Date    Deviated septum      Past Surgical History:   Procedure Laterality Date    ADENOIDECTOMY      APPLICATION OF CARTILAGE GRAFT N/A 8/3/2022    Procedure: APPLICATION, CARTILAGE GRAFT;  Surgeon: Lamine Culp MD;  Location: [facility] OR;  Service: ENT;  Laterality: N/A;  FROM right  EAR TO NOSE    NASAL STENOSIS REPAIR N/A 8/3/2022    Procedure: REPAIR, NOSE, VALVE;  Surgeon: Lamine Culp MD;  Location: [facility] OR;  Service: ENT;  Laterality: N/A;    NASAL TURBINATE REDUCTION Bilateral 2/10/2021    Procedure: REDUCTION, NASAL TURBINATE;  Surgeon: Lamine Culp MD;  Location: [facility] OR;  Service: ENT;  Laterality: Bilateral;    RHINOPLASTY N/A 2/10/2021    Procedure: SEPTORHINOPLASTY;  Surgeon: Lamine Culp MD;  Location: [facility] OR;  Service: ENT;  Laterality: N/A;    TONSILLECTOMY      WISDOM TOOTH EXTRACTION       Review of patient's allergies indicates:  No Known Allergies  Current Outpatient Medications on File Prior to Visit   Medication Sig Dispense Refill    amoxicillin-clavulanate 875-125mg (AUGMENTIN) 875-125 mg per tablet Take 1 tablet by mouth 2 (two) times daily. (Patient not taking: Reported on 3/13/2024) 14 tablet 0    bacitracin 500 unit/gram Oint Apply topically 2 (two) times daily. (Patient not taking: Reported on 3/13/2024) 30 g 0    ibuprofen (ADVIL,MOTRIN) 600 MG tablet Take 1 tablet (600 mg total) by mouth 4 (four) times daily as needed for Pain. (Patient not taking: Reported on 3/13/2024) 20 tablet 0    levonorgestreL (KYLEENA) 17.5 mcg/24 hrs (5 yrs) 19.5 mg IUD 1 each by Intrauterine route once.      multivit with calcium,iron,min (WOMEN'S DAILY MULTIVITAMIN ORAL) Take by mouth once  daily at 6am.      sertraline (ZOLOFT) 50 MG tablet Take 1 tablet (50 mg total) by mouth once daily. 30 tablet 2    sumatriptan (IMITREX) 50 MG tablet Take 1 tablet (50 mg total) by mouth every 8 (eight) hours. Prn headache 12 tablet 2    UNABLE TO FIND Take by mouth once daily at 6am. Iron supplement       No current facility-administered medications on file prior to visit.     Family History   Problem Relation Name Age of Onset    No Known Problems Mother      Amblyopia Father      Kidney cancer Father      Rhinitis Maternal Grandmother      Cataracts Maternal Grandfather      Retinal detachment Maternal Aunt      Breast cancer Paternal Aunt      Breast cancer Paternal Aunt      Urticaria Neg Hx      Immunodeficiency Neg Hx      Eczema Neg Hx      Atopy Neg Hx      Asthma Neg Hx      Angioedema Neg Hx      Allergies Neg Hx      Allergic rhinitis Neg Hx      Ovarian cancer Neg Hx             Ohs Peq Odvv Intake    4/17/2024  2:49 PM CDT - Filed by Patient   What is your current physical address in the event of a medical emergency? [address] [place] [address]   Are you able to take your vital signs? No   Please attach any relevant images or files          24 yo female with c/o 11 Mouth ulcers. She states worsening .  She has tried magic mouthwash, lycine, and other otc remedies. She denies fever. Denies dental caries.     Mouth Lesions   Associated symptoms include mouth sores.       Constitution: Negative.   HENT:  Positive for mouth sores.    Cardiovascular: Negative.    Respiratory: Negative.     Gastrointestinal: Negative.    Endocrine: negative.   Genitourinary: Negative.  Negative for frequency and urgency.   Musculoskeletal: Negative.    Skin: Negative.    Allergic/Immunologic: Negative.    Neurological: Negative.    Hematologic/Lymphatic: Negative.    Psychiatric/Behavioral: Negative.          Objective:   The physical exam was conducted virtually.  LOCATION OF PATIENT home  Physical Exam    Constitutional: She is oriented to person, place, and time. She appears well-developed.   HENT:   Head: Normocephalic and atraumatic.   Ears:   Right Ear: Hearing, tympanic membrane and external ear normal.   Left Ear: Hearing, tympanic membrane and external ear normal.   Nose: Nose normal.   Mouth/Throat: Uvula is midline, oropharynx is clear and moist and mucous membranes are normal.   Eyes: Conjunctivae and EOM are normal. Pupils are equal, round, and reactive to light.   Neck: Neck supple.   Cardiovascular: Normal rate.   Pulmonary/Chest: Effort normal and breath sounds normal.   Musculoskeletal: Normal range of motion.         General: Normal range of motion.   Neurological: She is alert and oriented to person, place, and time.   Skin: Skin is warm.   Psychiatric: Her behavior is normal. Thought content normal.   Nursing note and vitals reviewed.      Assessment:     1. Mouth ulcer        Plan:   Advised f/u with pcp for further work up.    Follow up with your primary care provider if symptoms persist.  Go to the Emergency room for worsening of symptoms.       Mouth ulcer

## 2024-04-17 NOTE — PROGRESS NOTES
Patient ID: Sophia Ann Boeckl is a 23 y.o. female.    E-VISIT     Chief Complaint: Rash (Entered automatically based on patient selection in Patient Portal.)    The patient initiated a request through [employer] on 4/17/2024 for evaluation and management with a chief complaint of Rash (Entered automatically based on patient selection in Patient Portal.)     I evaluated the questionnaire submission on 4/17/24.    Assessment and plan     1. Recurrent aphthous stomatitis  - dexAMETHasone (DECADRON) 0.5 mg/5 mL Elix; Take 10 mLs (1 mg total) by mouth every 8 (eight) hours. swish and spit three times daily. It is important to keep the medication in the mouth for five minutes prior to spitting it out. Do not rinse afterward and avoid eating or drinking for 30 minutes. for 10 days  Dispense: 300 mL; Refill: 0          HPI     Ohs Peq Evisit Rash    4/17/2024  3:12 PM CDT - Filed by Patient   Do you agree to participate in an E-Visit? Yes   If you have any of the following symptoms, please present to your local ER or call 911:  I acknowledge   What is the main issue you would like addressed today? Many mouth ulcers   Are you able to take your vital signs? No   Are you pregnant, could you be pregnant, or are you breast feeding? None of the above   How would you describe your skin problem? Lump or bump   When did your symptoms first appear? 4/14/2024   Where is it located?  Other   Does it itch? No   Does it hurt? Yes   Where is the pain located? Where the skin change is noted   The pain came on: Suddenly   The pain has the character of: Sharp   Frequency of the pain (How often does it appear)? Fluctuates at random   Please select the face that most closely captures your pain level: 6   Is there discharge or drainage? No   Is there bleeding? No   Describe the character Open   Describe the color Yellow   Has it changed over time? Grown in size   Frequency of skin problem Fluctuates at random   Duration of the skin problem (how  long does it stay when it is present) Days   I have had a new exposure to No new exposures   I have had a new exposure to No new exposures   What have you used to treat the skin problem? Magic Mouth wash, Kanka, Canker-X, Curoxen, L-Lysine, B12   If you have used anything for treatment, has it helped the symptoms? Yes   Other generalized symptoms that you associate with the rash Swollen lymph nodes   Provide any additional information you feel is important. I have most of them under my tongue, but also in the back of my throat, on my gums etc. Kanka and magic mouth wash help with pain, but david just gotten more ulcers over the last few days. Also some of them are lots of small white spots not one big sore.   At least one photo is required for treatment to be provided. You can upload a maximum of three photos of the affected area.            No follow-ups on file.    E-Visit Time Tracking:    Day 1 Time (in minutes): 11    Total Time (in minutes): 11

## 2024-04-19 ENCOUNTER — OFFICE VISIT (OUTPATIENT)
Dept: FAMILY MEDICINE | Facility: CLINIC | Age: 24
End: 2024-04-19
Payer: COMMERCIAL

## 2024-04-19 VITALS
OXYGEN SATURATION: 100 % | HEIGHT: 68 IN | TEMPERATURE: 98 F | DIASTOLIC BLOOD PRESSURE: 80 MMHG | BODY MASS INDEX: 24.06 KG/M2 | HEART RATE: 70 BPM | SYSTOLIC BLOOD PRESSURE: 102 MMHG | WEIGHT: 158.75 LBS

## 2024-04-19 DIAGNOSIS — H92.01 RIGHT EAR PAIN: ICD-10-CM

## 2024-04-19 DIAGNOSIS — D50.9 IRON DEFICIENCY ANEMIA, UNSPECIFIED IRON DEFICIENCY ANEMIA TYPE: ICD-10-CM

## 2024-04-19 DIAGNOSIS — K13.79 RECURRENT ORAL ULCERS: Primary | ICD-10-CM

## 2024-04-19 LAB
FERRITIN SERPL-MCNC: 134 NG/ML (ref 20–300)
IRON SERPL-MCNC: 86 UG/DL (ref 30–160)
SATURATED IRON: 27 % (ref 20–50)
TOTAL IRON BINDING CAPACITY: 320 UG/DL (ref 250–450)
TRANSFERRIN SERPL-MCNC: 216 MG/DL (ref 200–375)

## 2024-04-19 PROCEDURE — 36415 COLL VENOUS BLD VENIPUNCTURE: CPT | Mod: S$GLB,,, | Performed by: NURSE PRACTITIONER

## 2024-04-19 PROCEDURE — 1159F MED LIST DOCD IN RCRD: CPT | Mod: CPTII,S$GLB,, | Performed by: NURSE PRACTITIONER

## 2024-04-19 PROCEDURE — 3044F HG A1C LEVEL LT 7.0%: CPT | Mod: CPTII,S$GLB,, | Performed by: NURSE PRACTITIONER

## 2024-04-19 PROCEDURE — 82728 ASSAY OF FERRITIN: CPT | Performed by: NURSE PRACTITIONER

## 2024-04-19 PROCEDURE — 1160F RVW MEDS BY RX/DR IN RCRD: CPT | Mod: CPTII,S$GLB,, | Performed by: NURSE PRACTITIONER

## 2024-04-19 PROCEDURE — 3079F DIAST BP 80-89 MM HG: CPT | Mod: CPTII,S$GLB,, | Performed by: NURSE PRACTITIONER

## 2024-04-19 PROCEDURE — 99213 OFFICE O/P EST LOW 20 MIN: CPT | Mod: S$GLB,,, | Performed by: NURSE PRACTITIONER

## 2024-04-19 PROCEDURE — 83540 ASSAY OF IRON: CPT | Performed by: NURSE PRACTITIONER

## 2024-04-19 PROCEDURE — 3074F SYST BP LT 130 MM HG: CPT | Mod: CPTII,S$GLB,, | Performed by: NURSE PRACTITIONER

## 2024-04-19 PROCEDURE — 3008F BODY MASS INDEX DOCD: CPT | Mod: CPTII,S$GLB,, | Performed by: NURSE PRACTITIONER

## 2024-04-19 NOTE — PROGRESS NOTES
Subjective:       Patient ID: Sophia Ann Boeckl is a 23 y.o. female.    Chief Complaint: Otalgia    Otalgia   Associated symptoms include headaches (tension headache). Pertinent negatives include no abdominal pain, coughing, diarrhea or vomiting.     New patient to me, here for urgent care visit. States she has a lot of canker sores. Onset over this past week. Has tried multiple solutions.  States she has a break out like this about every 2 years.  Lysine  usually helps, but not this time. States her mother thinks this is related to ARIELLE which she has had in the past. Would like to have labs drawn today. States most painful ulcer is next to the right back molar. States it sends pain up into the right ear. States clenching teeth more due to discomfort. She denies fever. No stomach upset. No PND, sinus pain. See ROS     The following portion of the patients history was reviewed and updated as appropriate: allergies, current medications, past medical and surgical history. Past social history and problem list reviewed. Family PMH and Past social history reviewed. Tobacco, Illicit drug use reviewed.      Review of patient's allergies indicates:  No Known Allergies      Current Outpatient Medications:     dexAMETHasone (DECADRON) 0.5 mg/5 mL Elix, Take 10 mLs (1 mg total) by mouth every 8 (eight) hours. swish and spit three times daily. It is important to keep the medication in the mouth for five minutes prior to spitting it out. Do not rinse afterward and avoid eating or drinking for 30 minutes. for 10 days, Disp: 300 mL, Rfl: 0    levonorgestreL (KYLEENA) 17.5 mcg/24 hrs (5 yrs) 19.5 mg IUD, 1 each by Intrauterine route once., Disp: , Rfl:     multivit with calcium,iron,min (WOMEN'S DAILY MULTIVITAMIN ORAL), Take by mouth once daily at 6am., Disp: , Rfl:     sertraline (ZOLOFT) 50 MG tablet, Take 1 tablet (50 mg total) by mouth once daily., Disp: 30 tablet, Rfl: 2    sumatriptan (IMITREX) 50 MG tablet, Take 1 tablet (50  mg total) by mouth every 8 (eight) hours. Prn headache, Disp: 12 tablet, Rfl: 2    Past Medical History:   Diagnosis Date    Deviated septum        Past Surgical History:   Procedure Laterality Date    ADENOIDECTOMY      APPLICATION OF CARTILAGE GRAFT N/A 8/3/2022    Procedure: APPLICATION, CARTILAGE GRAFT;  Surgeon: Lamine Culp MD;  Location: [facility] OR;  Service: ENT;  Laterality: N/A;  FROM right  EAR TO NOSE    NASAL STENOSIS REPAIR N/A 8/3/2022    Procedure: REPAIR, NOSE, VALVE;  Surgeon: Lamine Clup MD;  Location: [facility] OR;  Service: ENT;  Laterality: N/A;    NASAL TURBINATE REDUCTION Bilateral 2/10/2021    Procedure: REDUCTION, NASAL TURBINATE;  Surgeon: Lamine Culp MD;  Location: [facility] OR;  Service: ENT;  Laterality: Bilateral;    RHINOPLASTY N/A 2/10/2021    Procedure: SEPTORHINOPLASTY;  Surgeon: Lamine Culp MD;  Location: [facility] OR;  Service: ENT;  Laterality: N/A;    TONSILLECTOMY      WISDOM TOOTH EXTRACTION         Social History     Socioeconomic History    Marital status: Single   Tobacco Use    Smoking status: Never    Smokeless tobacco: Never   Substance and Sexual Activity    Alcohol use: Not Currently    Drug use: Never    Sexual activity: Not Currently   Social History Narrative    No smokers, 2 dos and a bird     Social Determinants of Health     Financial Resource Strain: Low Risk  (2/14/2024)    Overall Financial Resource Strain (CARDIA)     Difficulty of Paying Living Expenses: Not hard at all   Food Insecurity: No Food Insecurity (2/14/2024)    Hunger Vital Sign     Worried About Running Out of Food in the Last Year: Never true     Ran Out of Food in the Last Year: Never true   Transportation Needs: Unmet Transportation Needs (2/14/2024)    PRAPARE - Transportation     Lack of Transportation (Medical): Yes     Lack of Transportation (Non-Medical): No   Physical Activity: Sufficiently Active (2/14/2024)    Exercise Vital Sign     Days of Exercise per Week: 5 days     Minutes of Exercise per  "Session: 30 min   Stress: Stress Concern Present (2/14/2024)    [employer] - Occupational Stress Questionnaire     Feeling of Stress : Very much   Social Connections: Unknown (2/14/2024)    Social Connection and Isolation Panel [NHANES]     Frequency of Communication with Friends and Family: More than three times a week     Frequency of Social Gatherings with Friends and Family: More than three times a week     Active Member of Clubs or Organizations: Yes     Attends Club or Organization Meetings: More than 4 times per year     Marital Status: Patient declined   Housing Stability: Low Risk  (2/14/2024)    Housing Stability Vital Sign     Unable to Pay for Housing in the Last Year: No     Number of Places Lived in the Last Year: 2     Unstable Housing in the Last Year: No     Review of Systems   Constitutional:  Negative for fatigue and fever.   HENT:  Positive for congestion (green mucous), ear pain, mouth sores and postnasal drip.    Eyes:  Negative for visual disturbance.   Respiratory:  Negative for cough, chest tightness, shortness of breath and wheezing.    Cardiovascular:  Negative for chest pain, palpitations and leg swelling.   Gastrointestinal:  Negative for abdominal pain, blood in stool, diarrhea, nausea and vomiting.   Genitourinary: Negative.    Musculoskeletal:  Negative for arthralgias, back pain and gait problem.   Skin: Negative.    Neurological:  Positive for headaches (tension headache).   Psychiatric/Behavioral:  Negative for dysphoric mood and sleep disturbance. The patient is not nervous/anxious.        Objective:      /80 (BP Location: Left arm, Patient Position: Sitting, BP Method: Medium (Manual))   Pulse 70   Temp 98.4 °F (36.9 °C)   Ht 5' 8" (1.727 m)   Wt 72 kg (158 lb 11.7 oz)   SpO2 100%   BMI 24.14 kg/m²      Physical Exam  Constitutional:       Appearance: Normal appearance. She is normal weight.   HENT:      Head: Normocephalic.      Right " Ear: Tympanic membrane, ear canal and external ear normal.      Left Ear: Tympanic membrane, ear canal and external ear normal.      Nose: Rhinorrhea present. No congestion.      Right Sinus: No maxillary sinus tenderness.      Left Sinus: No maxillary sinus tenderness.      Mouth/Throat:        Comments: Multiple smaller ulcers under tongue.   Eyes:      Pupils: Pupils are equal, round, and reactive to light.   Neck:      Thyroid: No thyromegaly.      Vascular: No carotid bruit.   Cardiovascular:      Rate and Rhythm: Normal rate and regular rhythm.      Pulses: Normal pulses.      Heart sounds: Normal heart sounds. No murmur heard.  Pulmonary:      Effort: Pulmonary effort is normal.      Breath sounds: Normal breath sounds. No decreased breath sounds or wheezing.   Musculoskeletal:         General: Normal range of motion.      Cervical back: Normal range of motion.      Comments: Gait normal.  strong, equal   Lymphadenopathy:      Head:      Right side of head: No submandibular adenopathy.      Left side of head: No submandibular adenopathy.   Skin:     General: Skin is warm and dry.      Capillary Refill: Capillary refill takes less than 2 seconds.   Neurological:      General: No focal deficit present.      Mental Status: She is alert.   Psychiatric:         Attention and Perception: Attention and perception normal.         Mood and Affect: Mood and affect normal.         Speech: Speech normal.         Behavior: Behavior normal.         Assessment:       1. Recurrent oral ulcers    2. Right ear pain    3. Iron deficiency anemia, unspecified iron deficiency anemia type        Plan:       Recurrent oral ulcers: states she has had these at times since young age. Will send prescription to [facility].     Polyphenol sulfuric acid complex solution. Apply twice daily to ulcer for 2 days.  15 ml.     Right ear pain: presents as deferred pain from the ulcer in the back of her mouth by the molar.     Iron  deficiency anemia, unspecified iron deficiency anemia type: will draw labs. Increase iron rich foods in diet.   -     Iron and TIBC  -     Ferritin       Continue current medication  Take medications only as prescribed  Healthy diet, exercise  Adequate rest  Adequate hydration  Avoid allergens  Avoid excessive caffeine     Follow up if not improving

## 2024-04-22 ENCOUNTER — PATIENT MESSAGE (OUTPATIENT)
Dept: FAMILY MEDICINE | Facility: CLINIC | Age: 24
End: 2024-04-22
Payer: COMMERCIAL

## 2024-04-22 ENCOUNTER — DOCUMENTATION ONLY (OUTPATIENT)
Dept: OBSTETRICS AND GYNECOLOGY | Facility: CLINIC | Age: 24
End: 2024-04-22
Payer: COMMERCIAL

## 2024-04-29 ENCOUNTER — PATIENT MESSAGE (OUTPATIENT)
Dept: PSYCHIATRY | Facility: CLINIC | Age: 24
End: 2024-04-29
Payer: COMMERCIAL

## 2024-04-29 ENCOUNTER — DOCUMENTATION ONLY (OUTPATIENT)
Dept: OBSTETRICS AND GYNECOLOGY | Facility: CLINIC | Age: 24
End: 2024-04-29
Payer: COMMERCIAL

## 2024-05-01 ENCOUNTER — PATIENT MESSAGE (OUTPATIENT)
Dept: PSYCHIATRY | Facility: CLINIC | Age: 24
End: 2024-05-01
Payer: COMMERCIAL

## 2024-05-01 ENCOUNTER — OFFICE VISIT (OUTPATIENT)
Dept: PSYCHIATRY | Facility: CLINIC | Age: 24
End: 2024-05-01
Payer: COMMERCIAL

## 2024-05-01 DIAGNOSIS — F33.0 MDD (MAJOR DEPRESSIVE DISORDER), RECURRENT EPISODE, MILD: ICD-10-CM

## 2024-05-01 DIAGNOSIS — F43.23 ADJUSTMENT DISORDER WITH MIXED ANXIETY AND DEPRESSED MOOD: ICD-10-CM

## 2024-05-01 DIAGNOSIS — F41.1 GAD (GENERALIZED ANXIETY DISORDER): Primary | ICD-10-CM

## 2024-05-01 PROCEDURE — 90834 PSYTX W PT 45 MINUTES: CPT | Mod: 95,,, | Performed by: SOCIAL WORKER

## 2024-05-01 PROCEDURE — 3044F HG A1C LEVEL LT 7.0%: CPT | Mod: CPTII,95,, | Performed by: SOCIAL WORKER

## 2024-05-01 NOTE — PROGRESS NOTES
"Individual Psychotherapy (PhD/LCSW)    05/01/2024    Interim Events/Subjective Report/Content of Current Session:  follow-up appointment.    Pt is a 23 y.o. female with past psychiatric hx of  adjustment do and lucia who presents for follow-up treatment.  Pt stated she withdrew from school. Noticed she began isolating herself from friends and family. Took a medical leave from school due to mental health . 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     Pt spent time in session discussing thoughts of self worth, depression, anxiety towards school, expectations of herself.     Pt focused on wanting to learn "things about myself and figure out with I am doing wrong and what is right." Sw provided reframe of thought processes to assist with pt challenging negative thoughts of herself and future.  Discussed limitations patient expectations.  Patient indicated there is a consistent obstacle of meeting expectations that is interrupting mental health.     Plan is for pt to begin more regularly scheduled session. Discussed with pt about no show policy and ways to handle if she is not attending session as there were two no shows in one month. Pt expressed understanding.     Current symptoms:  Depression: dysphoric mood, anhedonia, worthlessness/guilt, fatigue, and difficulty concentrating.Pt denies any current SI/HI. Denies any current A/VH.   Anxiety: excessive worrying and restlessness.  Sleep:  denied .  Ashley:  denies.  Psychosis: denies .    Will continue to follow. Next session pt is to begin with informing sw about goals for leave from school to assist in mental health stability.   Pt aware to contact  for any additional needs that may occur prior to next session.  Therapeutic Intervention/Techniques: behavior modification, insight oriented, interactive, and supportive; relevant to diagnosis, " patient responds to this modality    Risk Parameters:  Patient reports no suicidal ideation  Patient reports no homicidal ideation  Patient reports no self-injurious behavior  Patient reports no violent behavior    Diagnosis:   1. JORGE (generalized anxiety disorder)        2. Adjustment disorder with mixed anxiety and depressed mood        3. MDD (major depressive disorder), recurrent episode, mild          Return to Clinic: 1 week, as scheduled  Counseling time: 38  -Call to report any worsening of symptoms or problems associated with medication.  - Pt instructed to go to ER if thoughts of harming self or others arise.   -Supportive therapy and psychoeducation provided  -Pt instructed to call clinic, 911 or go to nearest emergency room if sxs worsen or pt is in crisis. The pt expresses understanding.   Each patient to whom he or she provides medical services by telemedicine is:  (1) informed of the relationship between the physician and patient and the respective role of any other health care provider with respect to management of the patient; and (2) notified that he or she may decline to receive medical services by telemedicine and may withdraw from such care at any time.

## 2024-05-07 ENCOUNTER — PATIENT MESSAGE (OUTPATIENT)
Dept: PSYCHIATRY | Facility: CLINIC | Age: 24
End: 2024-05-07

## 2024-05-09 ENCOUNTER — OFFICE VISIT (OUTPATIENT)
Dept: FAMILY MEDICINE | Facility: CLINIC | Age: 24
End: 2024-05-09
Payer: COMMERCIAL

## 2024-05-09 DIAGNOSIS — F32.A ANXIETY AND DEPRESSION: Primary | ICD-10-CM

## 2024-05-09 DIAGNOSIS — F41.9 ANXIETY AND DEPRESSION: Primary | ICD-10-CM

## 2024-05-09 PROCEDURE — 1159F MED LIST DOCD IN RCRD: CPT | Mod: CPTII,95,, | Performed by: INTERNAL MEDICINE

## 2024-05-09 PROCEDURE — 3044F HG A1C LEVEL LT 7.0%: CPT | Mod: CPTII,95,, | Performed by: INTERNAL MEDICINE

## 2024-05-09 PROCEDURE — 99213 OFFICE O/P EST LOW 20 MIN: CPT | Mod: 95,,, | Performed by: INTERNAL MEDICINE

## 2024-05-09 PROCEDURE — 1160F RVW MEDS BY RX/DR IN RCRD: CPT | Mod: CPTII,95,, | Performed by: INTERNAL MEDICINE

## 2024-05-09 RX ORDER — FLUOXETINE 10 MG/1
10 CAPSULE ORAL DAILY
Qty: 30 CAPSULE | Refills: 2 | Status: SHIPPED | OUTPATIENT
Start: 2024-05-09 | End: 2024-06-13

## 2024-05-09 NOTE — PROGRESS NOTES
Patient ID: Sophia Ann Boeckl is a 23 y.o. female.    Chief Complaint: No chief complaint on file.    Visit type: AUDIOVISUAL VIRTUAL    Problem  HPI  Plan   Anxiety and depression She has been on Zoloft for about 2 months now.  She is taking 50 mg.  She does not feel like it is helping much.  Additionally we did a pharmacogenetic panel which showed that Zoloft had some drug gene interaction.  We reviewed the list and found some options that were in the minimal gene drug interaction column.  We chose Prozac since she has both anxiety and depression and because it is easier to taper or discontinue if needed. Cross taper Zoloft to Prozac.  Take 25 mg of Zoloft for 5 days then start Prozac 10 mg.  Continue Zoloft every other day for 5 additional days.    Follow-up virtually in 5 weeks            1. Anxiety and depression  - FLUoxetine 10 MG capsule; Take 1 capsule (10 mg total) by mouth once daily.  Dispense: 30 capsule; Refill: 2          Review of Systems   Constitutional:  Negative for activity change and unexpected weight change.   HENT:  Negative for hearing loss, rhinorrhea and trouble swallowing.    Eyes:  Negative for discharge.   Respiratory:  Negative for chest tightness and wheezing.    Cardiovascular:  Negative for chest pain and palpitations.   Gastrointestinal:  Negative for blood in stool, constipation, diarrhea and vomiting.   Endocrine: Negative for polydipsia and polyuria.   Genitourinary:  Negative for difficulty urinating, dysuria, hematuria and menstrual problem.   Musculoskeletal:  Negative for arthralgias, joint swelling and neck pain.   Neurological:  Negative for weakness and headaches.   Psychiatric/Behavioral:  Positive for dysphoric mood. Negative for confusion.      Wt Readings from Last 3 Encounters:   04/19/24 72 kg (158 lb 11.7 oz)   03/13/24 72.8 kg (160 lb 7.9 oz)   03/12/24 72.1 kg (158 lb 15.2 oz)           COPD Medications       No Active Medications           Medication List with  Changes/Refills   New Medications    FLUOXETINE 10 MG CAPSULE    Take 1 capsule (10 mg total) by mouth once daily.   Current Medications    LEVONORGESTREL (KYLEENA) 17.5 MCG/24 HRS (5 YRS) 19.5 MG IUD    1 each by Intrauterine route once.    MULTIVIT WITH CALCIUM,IRON,MIN (WOMEN'S DAILY MULTIVITAMIN ORAL)    Take by mouth once daily at 6am.    SUMATRIPTAN (IMITREX) 50 MG TABLET    Take 1 tablet (50 mg total) by mouth every 8 (eight) hours. Prn headache   Discontinued Medications    SERTRALINE (ZOLOFT) 50 MG TABLET    Take 1 tablet (50 mg total) by mouth once daily.          The patient location is:  [place]  The chief complaint leading to consultation is:  Anxiety and depression  Face to Face time with patient:  22 minutes  minutes of total time spent on the encounter, which includes face to face time and   non-face to face time preparing to see the patient (eg, review of tests), Obtaining and/or   reviewing separately obtained history, Documenting clinical information in the electronic   or other health record, Independently interpreting results (not separately reported) and   communicating results to the patient/family/caregiver, or   Care coordination (not separately reported).     Each patient to whom he or she provides medical services by telemedicine is:    (1) informed of the relationship between the physician and patient and the respective   role of any other health care provider with respect to management of the patient; and   (2) notified that he or she may decline to receive medical services by telemedicine and   may withdraw from such care at any time.    I personally reviewed past medical, family and social history.

## 2024-05-13 ENCOUNTER — OFFICE VISIT (OUTPATIENT)
Dept: PSYCHIATRY | Facility: CLINIC | Age: 24
End: 2024-05-13
Payer: COMMERCIAL

## 2024-05-13 DIAGNOSIS — F41.1 GAD (GENERALIZED ANXIETY DISORDER): Primary | ICD-10-CM

## 2024-05-13 DIAGNOSIS — F33.0 MDD (MAJOR DEPRESSIVE DISORDER), RECURRENT EPISODE, MILD: ICD-10-CM

## 2024-05-13 DIAGNOSIS — F43.23 ADJUSTMENT DISORDER WITH MIXED ANXIETY AND DEPRESSED MOOD: ICD-10-CM

## 2024-05-13 PROCEDURE — 3044F HG A1C LEVEL LT 7.0%: CPT | Mod: CPTII,95,, | Performed by: SOCIAL WORKER

## 2024-05-13 PROCEDURE — 90834 PSYTX W PT 45 MINUTES: CPT | Mod: 95,,, | Performed by: SOCIAL WORKER
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
## 2024-05-16 ENCOUNTER — OFFICE VISIT (OUTPATIENT)
Dept: OBSTETRICS AND GYNECOLOGY | Facility: CLINIC | Age: 24
End: 2024-05-16
Payer: COMMERCIAL

## 2024-05-16 ENCOUNTER — TELEPHONE (OUTPATIENT)
Dept: PSYCHIATRY | Facility: CLINIC | Age: 24
End: 2024-05-16
Payer: COMMERCIAL

## 2024-05-16 VITALS
BODY MASS INDEX: 24.74 KG/M2 | WEIGHT: 162.69 LBS | DIASTOLIC BLOOD PRESSURE: 78 MMHG | SYSTOLIC BLOOD PRESSURE: 118 MMHG

## 2024-05-16 DIAGNOSIS — N89.8 VAGINAL DISCHARGE: ICD-10-CM

## 2024-05-16 DIAGNOSIS — R87.610 ATYPICAL SQUAMOUS CELLS OF UNDETERMINED SIGNIFICANCE (ASCUS) ON PAPANICOLAOU SMEAR OF CERVIX: Primary | ICD-10-CM

## 2024-05-16 PROCEDURE — 1159F MED LIST DOCD IN RCRD: CPT | Mod: CPTII,S$GLB,, | Performed by: STUDENT IN AN ORGANIZED HEALTH CARE EDUCATION/TRAINING PROGRAM

## 2024-05-16 PROCEDURE — 99213 OFFICE O/P EST LOW 20 MIN: CPT | Mod: S$GLB,,, | Performed by: STUDENT IN AN ORGANIZED HEALTH CARE EDUCATION/TRAINING PROGRAM

## 2024-05-16 PROCEDURE — 81514 NFCT DS BV&VAGINITIS DNA ALG: CPT | Performed by: STUDENT IN AN ORGANIZED HEALTH CARE EDUCATION/TRAINING PROGRAM

## 2024-05-16 PROCEDURE — 3078F DIAST BP <80 MM HG: CPT | Mod: CPTII,S$GLB,, | Performed by: STUDENT IN AN ORGANIZED HEALTH CARE EDUCATION/TRAINING PROGRAM

## 2024-05-16 PROCEDURE — 3044F HG A1C LEVEL LT 7.0%: CPT | Mod: CPTII,S$GLB,, | Performed by: STUDENT IN AN ORGANIZED HEALTH CARE EDUCATION/TRAINING PROGRAM

## 2024-05-16 PROCEDURE — 87591 N.GONORRHOEAE DNA AMP PROB: CPT | Performed by: STUDENT IN AN ORGANIZED HEALTH CARE EDUCATION/TRAINING PROGRAM

## 2024-05-16 PROCEDURE — 3074F SYST BP LT 130 MM HG: CPT | Mod: CPTII,S$GLB,, | Performed by: STUDENT IN AN ORGANIZED HEALTH CARE EDUCATION/TRAINING PROGRAM

## 2024-05-16 PROCEDURE — 3008F BODY MASS INDEX DOCD: CPT | Mod: CPTII,S$GLB,, | Performed by: STUDENT IN AN ORGANIZED HEALTH CARE EDUCATION/TRAINING PROGRAM

## 2024-05-16 PROCEDURE — 99999 PR PBB SHADOW E&M-EST. PATIENT-LVL III: CPT | Mod: PBBFAC,,, | Performed by: STUDENT IN AN ORGANIZED HEALTH CARE EDUCATION/TRAINING PROGRAM

## 2024-05-16 NOTE — PROGRESS NOTES
History & Physical  Gynecology      SUBJECTIVE:     Chief Complaint: Establish Care and Abnormal Pap Smear       History of Present Illness:    Here today to discuss most recent pap smear. Also having some vaginal discharge. Clear/yellow tinged. Hasn't been active since march.     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     Review of patient's allergies indicates:  No Known Allergies    Past Medical History:   Diagnosis Date    Deviated septum      Past Surgical History:   Procedure Laterality Date    ADENOIDECTOMY      APPLICATION OF CARTILAGE GRAFT N/A 8/3/2022    Procedure: APPLICATION, CARTILAGE GRAFT;  Surgeon: Lamine Culp MD;  Location: [facility];  Service: ENT;  Laterality: N/A;  FROM right  EAR TO NOSE    NASAL STENOSIS REPAIR N/A 8/3/2022    Procedure: REPAIR, NOSE, VALVE;  Surgeon: Lamine Culp MD;  Location: [facility] OR;  Service: ENT;  Laterality: N/A;    NASAL TURBINATE REDUCTION Bilateral 2/10/2021    Procedure: REDUCTION, NASAL TURBINATE;  Surgeon: Lamine Culp MD;  Location: [facility] OR;  Service: ENT;  Laterality: Bilateral;    RHINOPLASTY N/A 2/10/2021    Procedure: SEPTORHINOPLASTY;  Surgeon: Lamine Culp MD;  Location: [facility];  Service: ENT;  Laterality: N/A;    TONSILLECTOMY      WISDOM TOOTH EXTRACTION       OB History          0    Para   0    Term   0       0    AB   0    Living   0         SAB   0    IAB   0    Ectopic   0    Multiple   0    Live Births   0               Family History   Problem Relation Name Age of Onset    Rhinitis Maternal Grandmother      Cataracts Maternal Grandfather      Amblyopia Father      Kidney cancer Father      No Known Problems Mother      Retinal detachment Maternal Aunt      Breast cancer Paternal Aunt      Breast cancer Paternal Aunt      Colon cancer Other      Urticaria Neg Hx      Immunodeficiency Neg Hx      Eczema Neg Hx      Atopy Neg Hx      Asthma Neg Hx      Angioedema Neg Hx      Allergies  Neg Hx      Allergic rhinitis Neg Hx      Ovarian cancer Neg Hx      Uterine cancer Neg Hx       Social History     Tobacco Use    Smoking status: Never    Smokeless tobacco: Never   Substance Use Topics    Alcohol use: Not Currently    Drug use: Never       Current Outpatient Medications   Medication Sig    FLUoxetine 10 MG capsule Take 1 capsule (10 mg total) by mouth once daily.    levonorgestreL (KYLEENA) 17.5 mcg/24 hrs (5 yrs) 19.5 mg IUD 1 each by Intrauterine route once. 2/2020    multivit with calcium,iron,min (WOMEN'S DAILY MULTIVITAMIN ORAL) Take by mouth once daily at 6am.    sumatriptan (IMITREX) 50 MG tablet Take 1 tablet (50 mg total) by mouth every 8 (eight) hours. Prn headache     No current facility-administered medications for this visit.         Review of Systems:  Review of Systems   Constitutional:  Negative for chills, fatigue and fever.   HENT:  Negative for congestion.    Eyes:  Negative for visual disturbance.   Respiratory:  Negative for cough and shortness of breath.    Cardiovascular:  Negative for chest pain and palpitations.   Gastrointestinal:  Negative for abdominal distention, abdominal pain, constipation, diarrhea, nausea and vomiting.   Genitourinary:  Negative for difficulty urinating, dysuria, hematuria, vaginal bleeding and vaginal discharge.   Skin:  Negative for rash.   Neurological:  Negative for dizziness, seizures, light-headedness and headaches.   Hematological:  Does not bruise/bleed easily.   Psychiatric/Behavioral:  Negative for dysphoric mood. The patient is not nervous/anxious.         OBJECTIVE:     Physical Exam:  Physical Exam  Vitals reviewed.   Constitutional:       General: She is not in acute distress.     Appearance: Normal appearance. She is well-developed.   HENT:      Head: Normocephalic and atraumatic.   Cardiovascular:      Rate and Rhythm: Normal rate and regular rhythm.   Pulmonary:      Effort: Pulmonary effort is normal.   Abdominal:      General:  There is no distension.      Palpations: Abdomen is soft.   Genitourinary:     Vagina: Normal.      Comments: Normal external female genitalia, normal hair distribution. Vaginal mucosa pink, moist, well rugated, scant white physiologic discharge. No blood in vault. Cervix pink, non-friable, without lesion. No CMT. Strings present     Skin:     General: Skin is warm.   Neurological:      Mental Status: She is alert and oriented to person, place, and time.   Psychiatric:         Behavior: Behavior normal.         Thought Content: Thought content normal.         Judgment: Judgment normal.           ASSESSMENT:       ICD-10-CM ICD-9-CM    1. Atypical squamous cells of undetermined significance (ASCUS) on Papanicolaou smear of cervix  R87.610 795.01       2. Vaginal discharge  N89.8 623.5 Vaginosis Screen by DNA Probe      C. trachomatis/N. gonorrhoeae by AMP DNA          Orders Placed This Encounter   Procedures    Vaginosis Screen by DNA Probe     Order Specific Question:   Release to patient     Answer:   Immediate    C. trachomatis/N. gonorrhoeae by AMP DNA     Order Specific Question:   Source:     Answer:   Cervicovaginal           Plan:      - ASCUS pap + HR HPV, recommend repeat pap @ 1 year given age < 25  - s/p HPV vaccination  - swabs done, will f/u on results   - will do repeat pap in January per patient request    Marline Smith M.D.  Obstetrics and Gynecology

## 2024-05-20 ENCOUNTER — OFFICE VISIT (OUTPATIENT)
Dept: PSYCHIATRY | Facility: CLINIC | Age: 24
End: 2024-05-20
Payer: COMMERCIAL

## 2024-05-20 DIAGNOSIS — F41.1 GAD (GENERALIZED ANXIETY DISORDER): Primary | ICD-10-CM

## 2024-05-20 DIAGNOSIS — F43.23 ADJUSTMENT DISORDER WITH MIXED ANXIETY AND DEPRESSED MOOD: ICD-10-CM

## 2024-05-20 PROCEDURE — 3044F HG A1C LEVEL LT 7.0%: CPT | Mod: CPTII,95,, | Performed by: SOCIAL WORKER

## 2024-05-20 PROCEDURE — 90834 PSYTX W PT 45 MINUTES: CPT | Mod: 95,,, | Performed by: SOCIAL WORKER
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
## 2024-05-21 LAB
C TRACH DNA SPEC QL NAA+PROBE: NOT DETECTED
N GONORRHOEA DNA SPEC QL NAA+PROBE: NOT DETECTED

## 2024-05-22 LAB
BACTERIAL VAGINOSIS DNA: NEGATIVE
CANDIDA GLABRATA DNA: NEGATIVE
CANDIDA KRUSEI DNA: NEGATIVE
CANDIDA RRNA VAG QL PROBE: NEGATIVE
T VAGINALIS RRNA GENITAL QL PROBE: NEGATIVE

## 2024-05-23 ENCOUNTER — PATIENT MESSAGE (OUTPATIENT)
Dept: FAMILY MEDICINE | Facility: CLINIC | Age: 24
End: 2024-05-23
Payer: COMMERCIAL

## 2024-05-27 ENCOUNTER — OFFICE VISIT (OUTPATIENT)
Dept: PSYCHIATRY | Facility: CLINIC | Age: 24
End: 2024-05-27
Payer: COMMERCIAL

## 2024-05-27 DIAGNOSIS — F41.1 GAD (GENERALIZED ANXIETY DISORDER): Primary | ICD-10-CM

## 2024-05-27 DIAGNOSIS — F33.0 MDD (MAJOR DEPRESSIVE DISORDER), RECURRENT EPISODE, MILD: ICD-10-CM

## 2024-05-27 DIAGNOSIS — F43.23 ADJUSTMENT DISORDER WITH MIXED ANXIETY AND DEPRESSED MOOD: ICD-10-CM

## 2024-05-27 PROCEDURE — 3044F HG A1C LEVEL LT 7.0%: CPT | Mod: CPTII,95,, | Performed by: SOCIAL WORKER

## 2024-05-27 PROCEDURE — 90834 PSYTX W PT 45 MINUTES: CPT | Mod: 95,,, | Performed by: SOCIAL WORKER
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
## 2024-05-30 ENCOUNTER — PATIENT MESSAGE (OUTPATIENT)
Dept: PSYCHIATRY | Facility: CLINIC | Age: 24
End: 2024-05-30
Payer: COMMERCIAL

## 2024-06-03 ENCOUNTER — PATIENT MESSAGE (OUTPATIENT)
Dept: PSYCHIATRY | Facility: CLINIC | Age: 24
End: 2024-06-03
Payer: COMMERCIAL

## 2024-06-03 ENCOUNTER — OFFICE VISIT (OUTPATIENT)
Dept: PSYCHIATRY | Facility: CLINIC | Age: 24
End: 2024-06-03
Payer: COMMERCIAL

## 2024-06-03 DIAGNOSIS — F33.0 MDD (MAJOR DEPRESSIVE DISORDER), RECURRENT EPISODE, MILD: ICD-10-CM

## 2024-06-03 DIAGNOSIS — F41.1 GAD (GENERALIZED ANXIETY DISORDER): Primary | ICD-10-CM

## 2024-06-03 DIAGNOSIS — F43.23 ADJUSTMENT DISORDER WITH MIXED ANXIETY AND DEPRESSED MOOD: ICD-10-CM

## 2024-06-03 PROCEDURE — 90834 PSYTX W PT 45 MINUTES: CPT | Mod: 95,,, | Performed by: SOCIAL WORKER

## 2024-06-03 PROCEDURE — 3044F HG A1C LEVEL LT 7.0%: CPT | Mod: CPTII,95,, | Performed by: SOCIAL WORKER

## 2024-06-03 NOTE — PROGRESS NOTES
"Individual Psychotherapy (PhD/LCSW)    06/03/2024    Interim Events/Subjective Report/Content of Current Session:  follow-up appointment.    Pt is a 23 y.o. female with past psychiatric hx of  adjustment do and lucia who presents for follow-up treatment.  Pt stated she is still at her sisters home. Discussed her mother is still attempting to reach out but on pt end "not wanting to talk."  Reported to be attempting to change patterns with her mother. Wanting to draw a boundary with mom about no longer living with her mother and moving in with sister permanently. Focused on ways to have this conversation to where it is effective.   Discussed communication techniques to approach this conversation. Focused on understanding patterns regarding relationship with mother.   JUNG discussed with pt and ways to approach this conversation with mother.     Working out in the morning for coping skills. Sw provided psychoeducation of grounding techniques as well as applied grounding in session. Pt identified senses that are beneficial for grounding specific to pt.     Current symptoms:  Depression: dysphoric mood.Pt denies any current SI/HI. Denies any current A/VH.   Anxiety: excessive worrying and restlessness.  Sleep:  denied .  Ashley:  denies.  Psychosis: denies .    Will continue to follow. Pt aware to contact  for any additional needs that may occur prior to next session.  Therapeutic Intervention/Techniques: behavior modification, insight oriented, interactive, and supportive; relevant to diagnosis, patient responds to this modality    Risk Parameters:  Patient reports no suicidal ideation  Patient reports no homicidal ideation  Patient reports no self-injurious behavior  Patient reports no violent behavior    Diagnosis:   1. LUCIA (generalized anxiety disorder)        2. Adjustment disorder with mixed anxiety and depressed mood        3. MDD (major depressive disorder), recurrent episode, mild                  Return to " Clinic: 1 week, as scheduled  Counseling time: 45  -Call to report any worsening of symptoms or problems associated with medication.  - Pt instructed to go to ER if thoughts of harming self or others arise.   -Supportive therapy and psychoeducation provided  -Pt instructed to call clinic, 911 or go to nearest emergency room if sxs worsen or pt is in crisis. The pt expresses understanding.   Each patient to whom he or she provides medical services by telemedicine is:  (1) informed of the relationship between the physician and patient and the respective role of any other health care provider with respect to management of the patient; and (2) notified that he or she may decline to receive medical services by telemedicine and may withdraw from such care at any time.

## 2024-06-07 ENCOUNTER — PATIENT MESSAGE (OUTPATIENT)
Dept: PSYCHIATRY | Facility: CLINIC | Age: 24
End: 2024-06-07
Payer: COMMERCIAL

## 2024-06-10 ENCOUNTER — PATIENT MESSAGE (OUTPATIENT)
Dept: PSYCHIATRY | Facility: CLINIC | Age: 24
End: 2024-06-10
Payer: COMMERCIAL

## 2024-06-13 ENCOUNTER — OFFICE VISIT (OUTPATIENT)
Dept: FAMILY MEDICINE | Facility: CLINIC | Age: 24
End: 2024-06-13
Payer: COMMERCIAL

## 2024-06-13 DIAGNOSIS — F41.9 ANXIETY AND DEPRESSION: ICD-10-CM

## 2024-06-13 DIAGNOSIS — L74.510 PRIMARY FOCAL HYPERHIDROSIS OF AXILLA: Primary | ICD-10-CM

## 2024-06-13 DIAGNOSIS — F32.A ANXIETY AND DEPRESSION: ICD-10-CM

## 2024-06-13 PROCEDURE — 1160F RVW MEDS BY RX/DR IN RCRD: CPT | Mod: CPTII,95,, | Performed by: INTERNAL MEDICINE

## 2024-06-13 PROCEDURE — 3044F HG A1C LEVEL LT 7.0%: CPT | Mod: CPTII,95,, | Performed by: INTERNAL MEDICINE

## 2024-06-13 PROCEDURE — 99214 OFFICE O/P EST MOD 30 MIN: CPT | Mod: 95,,, | Performed by: INTERNAL MEDICINE

## 2024-06-13 PROCEDURE — 1159F MED LIST DOCD IN RCRD: CPT | Mod: CPTII,95,, | Performed by: INTERNAL MEDICINE

## 2024-06-13 RX ORDER — FLUOXETINE HYDROCHLORIDE 20 MG/1
20 CAPSULE ORAL DAILY
Qty: 30 CAPSULE | Refills: 2 | Status: SHIPPED | OUTPATIENT
Start: 2024-06-13 | End: 2025-06-13

## 2024-06-13 RX ORDER — GLYCOPYRROLATE 1 MG/1
1 TABLET ORAL 2 TIMES DAILY
Qty: 60 TABLET | Refills: 2 | Status: SHIPPED | OUTPATIENT
Start: 2024-06-13 | End: 2024-09-11

## 2024-06-13 NOTE — PROGRESS NOTES
Patient ID: Sophia Ann Boeckl is a 23 y.o. female.    Chief Complaint: No chief complaint on file.    Visit type: AUDIOVISUAL VIRTUAL    HPI:  Follow-up depression and anxiety    Note that we did a pharmacogenetic panel and found that 1 of the best SSRIs for her would be Prozac.  She was taking Zoloft at that time  We did a cross taper to Prozac.  Now that she is on Prozac she feels like her depression is much better.  Her anxiety not so much.  Also she has had trouble yawning while on Prozac and also having increased axillary sweating.  She does not want to switch off of Prozac.  Discussed options for the anxiety.  One being adding BuSpar twice daily versus increasing the Prozac to 20 mg.  We decided to increase Prozac to 20 mg.  As far as the hyperhidrosis we will try topical anticholinergic if covered.  If not covered, we will try glycopyrrolate.  Will follow-up as needed and she will let me know if there are any issues with medication changes.      1. Primary focal hyperhidrosis of axilla  - glycopyrronium tosylate 2.4 % Towl; 1 towel to each axilla 2x daily.  Dispense: 30 each; Refill: 1  - glycopyrrolate (ROBINUL) 1 mg Tab; Take 1 tablet (1 mg total) by mouth 2 (two) times daily.  Dispense: 60 tablet; Refill: 2    2. Anxiety and depression  - FLUoxetine 20 MG capsule; Take 1 capsule (20 mg total) by mouth once daily.  Dispense: 30 capsule; Refill: 2     Increase Prozac to 20 mg   Trial of glycopyrronium or glycopyrrolate     Review of Systems   Constitutional:  Negative for activity change.   HENT:  Negative for hearing loss and trouble swallowing.    Eyes:  Negative for discharge.   Respiratory:  Negative for chest tightness and wheezing.    Cardiovascular:  Negative for chest pain and palpitations.   Gastrointestinal:  Negative for constipation, diarrhea and vomiting.   Genitourinary:  Negative for difficulty urinating and hematuria.   Neurological:  Negative for headaches.   Psychiatric/Behavioral:   Positive for dysphoric mood.      Wt Readings from Last 3 Encounters:   05/16/24 73.8 kg (162 lb 11.2 oz)   04/19/24 72 kg (158 lb 11.7 oz)   03/13/24 72.8 kg (160 lb 7.9 oz)           COPD Medications       No Active Medications           Medication List with Changes/Refills   New Medications    FLUOXETINE 20 MG CAPSULE    Take 1 capsule (20 mg total) by mouth once daily.    GLYCOPYRROLATE (ROBINUL) 1 MG TAB    Take 1 tablet (1 mg total) by mouth 2 (two) times daily.    GLYCOPYRRONIUM TOSYLATE 2.4 % TOWL    1 towel to each axilla 2x daily.   Current Medications    LEVONORGESTREL (KYLEENA) 17.5 MCG/24 HRS (5 YRS) 19.5 MG IUD    1 each by Intrauterine route once. 2/2020    MULTIVIT WITH CALCIUM,IRON,MIN (WOMEN'S DAILY MULTIVITAMIN ORAL)    Take by mouth once daily at 6am.    SUMATRIPTAN (IMITREX) 50 MG TABLET    Take 1 tablet (50 mg total) by mouth every 8 (eight) hours. Prn headache   Discontinued Medications    FLUOXETINE 10 MG CAPSULE    Take 1 capsule (10 mg total) by mouth once daily.          The patient location is:  [place]  The chief complaint leading to consultation is:  Follow-up  Face to Face time with patient:  26 minutes  minutes of total time spent on the encounter, which includes face to face time and   non-face to face time preparing to see the patient (eg, review of tests), Obtaining and/or   reviewing separately obtained history, Documenting clinical information in the electronic   or other health record, Independently interpreting results (not separately reported) and   communicating results to the patient/family/caregiver, or   Care coordination (not separately reported).     Each patient to whom he or she provides medical services by telemedicine is:    (1) informed of the relationship between the physician and patient and the respective   role of any other health care provider with respect to management of the patient; and   (2) notified that he or she may decline to receive medical services  by telemedicine and   may withdraw from such care at any time.    I personally reviewed past medical, family and social history.

## 2024-06-24 ENCOUNTER — OFFICE VISIT (OUTPATIENT)
Dept: PSYCHIATRY | Facility: CLINIC | Age: 24
End: 2024-06-24
Payer: COMMERCIAL

## 2024-06-24 DIAGNOSIS — F43.23 ADJUSTMENT DISORDER WITH MIXED ANXIETY AND DEPRESSED MOOD: Primary | ICD-10-CM

## 2024-06-24 DIAGNOSIS — F41.1 GAD (GENERALIZED ANXIETY DISORDER): ICD-10-CM

## 2024-06-24 DIAGNOSIS — F33.0 MDD (MAJOR DEPRESSIVE DISORDER), RECURRENT EPISODE, MILD: ICD-10-CM

## 2024-06-24 PROCEDURE — 90834 PSYTX W PT 45 MINUTES: CPT | Mod: 95,,, | Performed by: SOCIAL WORKER

## 2024-06-24 PROCEDURE — 3044F HG A1C LEVEL LT 7.0%: CPT | Mod: CPTII,95,, | Performed by: SOCIAL WORKER

## 2024-06-24 NOTE — PROGRESS NOTES
"Individual Psychotherapy (PhD/LCSW)    06/24/2024    Interim Events/Subjective Report/Content of Current Session:  follow-up appointment.    Pt is a 23 y.o. female with past psychiatric hx of  adjustment do and lucia who presents for follow-up treatment.  Pt stated she had made a decision regarding school. Decided she is going to leave [employer] and transfer to another school. Identifies this decision as a failure on her part and "having to go to an easier school." Discussed negative thinking and additional cognitive distortions.   Sw and pt identified that pt has not had moments in her life that she feels she was able to be aware of her individual wants and needs. Discuss lack of self worth, anxious attachment, and managing symptoms.     Current symptoms:  Depression: dysphoric mood.Pt denies any current SI/HI. Denies any current A/VH.   Anxiety: excessive worrying and restlessness.  Sleep:  denied .  Ashley:  denies.  Psychosis: denies .    Will continue to follow. Pt aware to contact sw for any additional needs that may occur prior to next session.  Therapeutic Intervention/Techniques: behavior modification, insight oriented, interactive, and supportive; relevant to diagnosis, patient responds to this modality    Risk Parameters:  Patient reports no suicidal ideation  Patient reports no homicidal ideation  Patient reports no self-injurious behavior  Patient reports no violent behavior    Diagnosis:   1. Adjustment disorder with mixed anxiety and depressed mood        2. LUCIA (generalized anxiety disorder)        3. MDD (major depressive disorder), recurrent episode, mild                    Return to Clinic: 1 week, as scheduled  Counseling time: 45  -Call to report any worsening of symptoms or problems associated with medication.  - Pt instructed to go to ER if thoughts of harming self or others arise.   -Supportive therapy and psychoeducation provided  -Pt instructed to call clinic, 911 or go to nearest emergency room " if sxs worsen or pt is in crisis. The pt expresses understanding.   Each patient to whom he or she provides medical services by telemedicine is:  (1) informed of the relationship between the physician and patient and the respective role of any other health care provider with respect to management of the patient; and (2) notified that he or she may decline to receive medical services by telemedicine and may withdraw from such care at any time.

## 2024-06-28 ENCOUNTER — PATIENT MESSAGE (OUTPATIENT)
Dept: PSYCHIATRY | Facility: CLINIC | Age: 24
End: 2024-06-28
Payer: COMMERCIAL

## 2024-07-01 ENCOUNTER — OFFICE VISIT (OUTPATIENT)
Dept: PSYCHIATRY | Facility: CLINIC | Age: 24
End: 2024-07-01
Payer: COMMERCIAL

## 2024-07-01 DIAGNOSIS — F43.23 ADJUSTMENT DISORDER WITH MIXED ANXIETY AND DEPRESSED MOOD: Primary | ICD-10-CM

## 2024-07-01 DIAGNOSIS — F33.0 MDD (MAJOR DEPRESSIVE DISORDER), RECURRENT EPISODE, MILD: ICD-10-CM

## 2024-07-01 DIAGNOSIS — F41.1 GAD (GENERALIZED ANXIETY DISORDER): ICD-10-CM

## 2024-07-01 PROCEDURE — 3044F HG A1C LEVEL LT 7.0%: CPT | Mod: CPTII,95,, | Performed by: SOCIAL WORKER

## 2024-07-01 PROCEDURE — 90834 PSYTX W PT 45 MINUTES: CPT | Mod: 95,,, | Performed by: SOCIAL WORKER

## 2024-07-01 NOTE — PROGRESS NOTES
Individual Psychotherapy (PhD/LCSW)    07/01/2024    Interim Events/Subjective Report/Content of Current Session:  follow-up appointment.    Pt is a 23 y.o. female with past psychiatric hx of  adjustment do and lucia who presents for follow-up treatment.  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 Having to understand how to approach the role. Discussed child development as well as self compassion to give pt understanding of higher expectations of herself that are causing increased anxiety.    Reported having a positive result from a difficult conversation with boyfriend.  Continuing to acknowledge anxious attachment and communication skills to work on within relationship.     Current symptoms:  Depression: dysphoric mood.  Anxiety: excessive worrying and restlessness.  Sleep:  denied .  Ashley:  denies.  Psychosis: denies .    Will continue to follow. Pt aware to contact sw for any additional needs that may occur prior to next session.  Therapeutic Intervention/Techniques: behavior modification, insight oriented, interactive, and supportive; relevant to diagnosis, patient responds to this modality    Risk Parameters:  Patient reports no suicidal ideation  Patient reports no homicidal ideation  Patient reports no self-injurious behavior  Patient reports no violent behavior    Diagnosis:   1. Adjustment disorder with mixed anxiety and depressed mood        2. LUCIA (generalized anxiety disorder)        3. MDD (major depressive disorder), recurrent episode, mild              Return to Clinic: 1 week, as scheduled  Counseling time: 45  -Call to report any worsening of symptoms or problems associated with medication.  - Pt instructed to go to ER if thoughts of harming self or others arise.   -Supportive therapy and psychoeducation provided  -Pt instructed to call clinic, 911 or go to nearest emergency room if sxs worsen or pt is in crisis. The pt expresses understanding.   Each patient to  whom he or she provides medical services by telemedicine is:  (1) informed of the relationship between the physician and patient and the respective role of any other health care provider with respect to management of the patient; and (2) notified that he or she may decline to receive medical services by telemedicine and may withdraw from such care at any time.

## 2024-07-05 ENCOUNTER — PATIENT MESSAGE (OUTPATIENT)
Dept: PSYCHIATRY | Facility: CLINIC | Age: 24
End: 2024-07-05
Payer: COMMERCIAL

## 2024-07-08 ENCOUNTER — PATIENT MESSAGE (OUTPATIENT)
Dept: FAMILY MEDICINE | Facility: CLINIC | Age: 24
End: 2024-07-08
Payer: COMMERCIAL

## 2024-07-09 ENCOUNTER — E-VISIT (OUTPATIENT)
Dept: FAMILY MEDICINE | Facility: CLINIC | Age: 24
End: 2024-07-09
Payer: COMMERCIAL

## 2024-07-09 DIAGNOSIS — F41.9 ANXIETY AND DEPRESSION: Primary | ICD-10-CM

## 2024-07-09 DIAGNOSIS — F32.A ANXIETY AND DEPRESSION: Primary | ICD-10-CM

## 2024-07-10 ENCOUNTER — PATIENT MESSAGE (OUTPATIENT)
Dept: FAMILY MEDICINE | Facility: CLINIC | Age: 24
End: 2024-07-10
Payer: COMMERCIAL

## 2024-07-10 DIAGNOSIS — F32.A ANXIETY AND DEPRESSION: Primary | ICD-10-CM

## 2024-07-10 DIAGNOSIS — F41.9 ANXIETY AND DEPRESSION: Primary | ICD-10-CM

## 2024-07-10 RX ORDER — BUSPIRONE HYDROCHLORIDE 5 MG/1
TABLET ORAL
Qty: 60 TABLET | Refills: 2 | Status: SHIPPED | OUTPATIENT
Start: 2024-07-10

## 2024-07-10 NOTE — PROGRESS NOTES
"     Patient ID: Sophia Ann Boeckl is a 23 y.o. female.    E-VISIT     Chief Complaint: Mood Disorder (Entered automatically based on patient selection in [employer].)    The patient initiated a request through [employer] on 7/9/2024 for evaluation and management with a chief complaint of Mood Disorder (Entered automatically based on patient selection in [employer].)     I evaluated the questionnaire submission on 7/10/24.    Assessment and plan     1. Anxiety and depression       Add buspar 5 mg BID. Can increase to 10 mg BID if no change in symptoms after 3 days.      HPI     [facility] Peq Evisit Anxiety/Depression    7/10/2024 12:21 PM CDT - Filed by Patient   Do you agree to participate in an E-Visit? Yes   If you have any of the following symptoms, please present to your local emergency room or call 911:  I acknowledge   Medication requests for narcotics will not be addressed via an E-Visit.  Please schedule an appointment. I acknowledge   Choose the state of your primary residence [place]   Are you pregnant, could you be pregnant, or are you breast feeding? None of the above   What is the main issue you would like addressed today? Anxiety   Fear of embarrassment causes me to avoid doing things or speaking to people. Yes   I avoid activities in which I am the center of attention. Yes   Being embarrassed or looking stupid are among my worst fears. No   I would like to address: Medication for Anxiety or Depression   By selecting "I understand," you acknowledge that the answers that you provide for this questionnaire may not be immediately viewed by your provider or your care team. If you are personally dealing with suicidal thoughts or a crisis, please consider contacting your provider's office.  If your provider is not available, please consider taking action by: calling 911 or the National Suicide Prevention Lifeline any day, any time at 1-232.378.2180 or texting SIGNS to 260187 for 24/7 anonymous, free crisis counseling. " I understand   Over the last 2 weeks, how often have you been bothered by the following problems?   Little interest or pleasure in doing things: Several days   Feeling down, depressed or hopeless: Several days   Patient's PHQ score (range: 0 - 6) 686100   Feeling nervous, anxious, on edge Nearly everyday   Not being able to stop or control worrying Nearly everyday   Worrying too much about different things Nearly everyday   Trouble relaxing Nearly everyday   Being so restless that its hard to sit still Nearly everyday   Becoming easily annoyed or irritable More than half the days   Feeling afraid as if something awful might happen Nearly everyday   If you marked you are experiencing any of the aforementioned problems, how difficult have these made it for you to do your work, take care of things at home, or get along with other people? Very difficult   TOTAL SCORE: (range: 0 - 21) 20   Do you want to address a new or existing medication? I would like to start a new medication that I do not already take   What is the name of the medication that you would like to start? I dont rememer the anxiety medication you mentioned. Something that started with B maybe   Have you taken a similar medication in the past? No   Why are you requesting this particular medication? I like the way my prozac helps depression, but it hasnt helped my anxiety    What medical condition is the  medication intended to treat? Anxiety   Provide any additional information you feel is important.    Please attach any relevant images or files    Are you able to take your vital signs? No          No follow-ups on file.    E-Visit Time Tracking:    Day 1 Time (in minutes): 10    Total Time (in minutes): 10

## 2024-07-11 NOTE — TELEPHONE ENCOUNTER
Patient has an appointment with psychiatry on 7.15.24, Dr. Leon says she does not need a visit with him.

## 2024-07-15 ENCOUNTER — PATIENT MESSAGE (OUTPATIENT)
Dept: PSYCHIATRY | Facility: CLINIC | Age: 24
End: 2024-07-15
Payer: COMMERCIAL

## 2024-07-15 ENCOUNTER — OFFICE VISIT (OUTPATIENT)
Dept: PSYCHIATRY | Facility: CLINIC | Age: 24
End: 2024-07-15
Payer: COMMERCIAL

## 2024-07-15 DIAGNOSIS — F33.0 MDD (MAJOR DEPRESSIVE DISORDER), RECURRENT EPISODE, MILD: ICD-10-CM

## 2024-07-15 DIAGNOSIS — F41.1 GAD (GENERALIZED ANXIETY DISORDER): ICD-10-CM

## 2024-07-15 DIAGNOSIS — F43.23 ADJUSTMENT DISORDER WITH MIXED ANXIETY AND DEPRESSED MOOD: Primary | ICD-10-CM

## 2024-07-15 PROCEDURE — 90834 PSYTX W PT 45 MINUTES: CPT | Mod: 95,,, | Performed by: SOCIAL WORKER

## 2024-07-15 PROCEDURE — 3044F HG A1C LEVEL LT 7.0%: CPT | Mod: CPTII,95,, | Performed by: SOCIAL WORKER

## 2024-07-15 NOTE — PROGRESS NOTES
Individual Psychotherapy (PhD/LCSW)    07/15/2024    Interim Events/Subjective Report/Content of Current Session:  follow-up appointment.    Pt is a 23 y.o. female with past psychiatric hx of  adjustment do and lucia who presents for follow-up treatment.  Pt stated she has been experiencing increased physical responses with panic which feel different than usual anxiety reactions per pt report. Indicated not being able to identify a reason as to why but more so an accumulation of stressors. Sw normalized report and spent time in session discussing ways to work through panic response.    Pt provided education regarding panic within body as well as tools to apply such as the following:  progressive muscle relaxation, frequencies to listen to that are reportedly used to help decrease parasympathetic nervous system response, and additional grounding techniques.     Current symptoms:  Depression: dysphoric mood.  Anxiety: excessive worrying and restlessness.panic, gi issues  Sleep:  denied .  Ashley:  denies.  Psychosis: denies .    Will continue to follow. Pt aware to contact  for any additional needs that may occur prior to next session.  Therapeutic Intervention/Techniques: behavior modification, insight oriented, interactive, and supportive; relevant to diagnosis, patient responds to this modality    Risk Parameters:  Patient reports no suicidal ideation  Patient reports no homicidal ideation  Patient reports no self-injurious behavior  Patient reports no violent behavior    Diagnosis:   1. Adjustment disorder with mixed anxiety and depressed mood        2. LUCIA (generalized anxiety disorder)        3. MDD (major depressive disorder), recurrent episode, mild                Return to Clinic: 1 week, as scheduled  Counseling time: 45  -Call to report any worsening of symptoms or problems associated with medication.  - Pt instructed to go to ER if thoughts of harming self or others arise.   -Supportive therapy and  psychoeducation provided  -Pt instructed to call clinic, 911 or go to nearest emergency room if sxs worsen or pt is in crisis. The pt expresses understanding.   Each patient to whom he or she provides medical services by telemedicine is:  (1) informed of the relationship between the physician and patient and the respective role of any other health care provider with respect to management of the patient; and (2) notified that he or she may decline to receive medical services by telemedicine and may withdraw from such care at any time.

## 2024-07-18 ENCOUNTER — PATIENT MESSAGE (OUTPATIENT)
Dept: PSYCHIATRY | Facility: CLINIC | Age: 24
End: 2024-07-18
Payer: COMMERCIAL

## 2024-07-19 ENCOUNTER — PATIENT MESSAGE (OUTPATIENT)
Dept: PSYCHIATRY | Facility: CLINIC | Age: 24
End: 2024-07-19
Payer: COMMERCIAL

## 2024-07-22 ENCOUNTER — OFFICE VISIT (OUTPATIENT)
Dept: PSYCHIATRY | Facility: CLINIC | Age: 24
End: 2024-07-22
Payer: COMMERCIAL

## 2024-07-22 DIAGNOSIS — F33.0 MDD (MAJOR DEPRESSIVE DISORDER), RECURRENT EPISODE, MILD: ICD-10-CM

## 2024-07-22 DIAGNOSIS — F43.23 ADJUSTMENT DISORDER WITH MIXED ANXIETY AND DEPRESSED MOOD: Primary | ICD-10-CM

## 2024-07-22 DIAGNOSIS — F41.1 GAD (GENERALIZED ANXIETY DISORDER): ICD-10-CM

## 2024-07-22 PROCEDURE — 3044F HG A1C LEVEL LT 7.0%: CPT | Mod: CPTII,95,, | Performed by: SOCIAL WORKER

## 2024-07-22 PROCEDURE — 90834 PSYTX W PT 45 MINUTES: CPT | Mod: 95,,, | Performed by: SOCIAL WORKER

## 2024-07-22 NOTE — PROGRESS NOTES
Individual Psychotherapy (PhD/LCSW)    2024    Interim Events/Subjective Report/Content of Current Session:  follow-up appointment.    Pt is a 23 y.o. female with past psychiatric hx of  adjustment do and lucia who presents for follow-up treatment.  Pt stated her cousin overdosed in aunts bathroom and . Pt reported this cousin has suffered from a heroin addiction since 15yo. Pt identified he was having trouble with mental health as well. Processed loss. Pt appears to be triggered with loss from cousin in regards to her father's death. Anxiety present as well as questioning how to be present for family members. Pt and sw discussed boundary setting. Provided suggestions for tools and techniques to draw healthy boundaries.     Pt has been using PMR to manage panic and anxiety. Reported having success with this tool.     Current symptoms:  Depression: dysphoric mood.  Anxiety: excessive worrying and restlessness.panic, gi issues  Sleep:  denied .  Ashley:  denies.  Psychosis: denies .    Will continue to follow. Pt aware to contact sw for any additional needs that may occur prior to next session.  Therapeutic Intervention/Techniques: behavior modification, insight oriented, interactive, and supportive; relevant to diagnosis, patient responds to this modality    Risk Parameters:  Patient reports no suicidal ideation  Patient reports no homicidal ideation  Patient reports no self-injurious behavior  Patient reports no violent behavior    Diagnosis:   1. Adjustment disorder with mixed anxiety and depressed mood        2. LUCIA (generalized anxiety disorder)        3. MDD (major depressive disorder), recurrent episode, mild                  Return to Clinic: 1 week, as scheduled  Counseling time: 45  -Call to report any worsening of symptoms or problems associated with medication.  - Pt instructed to go to ER if thoughts of harming self or others arise.   -Supportive therapy and psychoeducation provided  -Pt instructed  to call clinic, 911 or go to nearest emergency room if sxs worsen or pt is in crisis. The pt expresses understanding.   Each patient to whom he or she provides medical services by telemedicine is:  (1) informed of the relationship between the physician and patient and the respective role of any other health care provider with respect to management of the patient; and (2) notified that he or she may decline to receive medical services by telemedicine and may withdraw from such care at any time.

## 2024-08-01 ENCOUNTER — PATIENT MESSAGE (OUTPATIENT)
Dept: PSYCHIATRY | Facility: CLINIC | Age: 24
End: 2024-08-01
Payer: COMMERCIAL

## 2024-08-06 ENCOUNTER — PATIENT MESSAGE (OUTPATIENT)
Dept: PSYCHIATRY | Facility: CLINIC | Age: 24
End: 2024-08-06
Payer: COMMERCIAL

## 2024-08-08 ENCOUNTER — OFFICE VISIT (OUTPATIENT)
Dept: PSYCHIATRY | Facility: CLINIC | Age: 24
End: 2024-08-08
Payer: COMMERCIAL

## 2024-08-08 DIAGNOSIS — F41.1 GAD (GENERALIZED ANXIETY DISORDER): ICD-10-CM

## 2024-08-08 DIAGNOSIS — F33.0 MDD (MAJOR DEPRESSIVE DISORDER), RECURRENT EPISODE, MILD: ICD-10-CM

## 2024-08-08 DIAGNOSIS — F43.23 ADJUSTMENT DISORDER WITH MIXED ANXIETY AND DEPRESSED MOOD: Primary | ICD-10-CM

## 2024-08-08 PROCEDURE — 3044F HG A1C LEVEL LT 7.0%: CPT | Mod: CPTII,95,, | Performed by: SOCIAL WORKER

## 2024-08-08 PROCEDURE — 90834 PSYTX W PT 45 MINUTES: CPT | Mod: 95,,, | Performed by: SOCIAL WORKER

## 2024-08-12 ENCOUNTER — OFFICE VISIT (OUTPATIENT)
Dept: PSYCHIATRY | Facility: CLINIC | Age: 24
End: 2024-08-12
Payer: COMMERCIAL

## 2024-08-12 DIAGNOSIS — F43.23 ADJUSTMENT DISORDER WITH MIXED ANXIETY AND DEPRESSED MOOD: Primary | ICD-10-CM

## 2024-08-12 DIAGNOSIS — F33.0 MDD (MAJOR DEPRESSIVE DISORDER), RECURRENT EPISODE, MILD: ICD-10-CM

## 2024-08-12 DIAGNOSIS — F32.A ANXIETY AND DEPRESSION: ICD-10-CM

## 2024-08-12 DIAGNOSIS — F41.9 ANXIETY AND DEPRESSION: ICD-10-CM

## 2024-08-12 DIAGNOSIS — F41.1 GAD (GENERALIZED ANXIETY DISORDER): ICD-10-CM

## 2024-08-12 PROCEDURE — 90834 PSYTX W PT 45 MINUTES: CPT | Mod: 95,,, | Performed by: SOCIAL WORKER

## 2024-08-12 PROCEDURE — 3044F HG A1C LEVEL LT 7.0%: CPT | Mod: CPTII,95,, | Performed by: SOCIAL WORKER

## 2024-08-12 RX ORDER — FLUOXETINE 10 MG/1
10 CAPSULE ORAL
Qty: 30 CAPSULE | Refills: 2 | OUTPATIENT
Start: 2024-08-12

## 2024-08-12 NOTE — TELEPHONE ENCOUNTER
No care due was identified.  [facility] Embedded Care Due Messages. Reference number: [id number].   8/12/2024 12:25:14 AM CDT

## 2024-08-12 NOTE — TELEPHONE ENCOUNTER
Refill Decision Note   Buffy Boeckl  is requesting a refill authorization.  Brief Assessment and Rationale for Refill:  Quick Discontinue     Medication Therapy Plan:  Dosage increased to 20 mg daily; QDC      Comments:     Note composed:12:35 PM 08/12/2024

## 2024-08-12 NOTE — PROGRESS NOTES
Individual Psychotherapy (PhD/LCSW)    08/12/2024    Interim Events/Subjective Report/Content of Current Session:  follow-up appointment.    Pt is a 23 y.o. female with past psychiatric hx of  adjustment do and lucia who presents for follow-up treatment.  Reviewed previous week. Pt stated she got out of the house and spent time with bf. Pt stated she also spoke with her bf about her mh and ways that she processes situations due to depression and anxiety.  Pt stated she was drinking this past weekend and woke up with anxiety . Took time to ground herself during the day and utilize tools that have been taught. Pt stated she felt frustrated but was able to approach her symptoms vs avoid them.   Pt discussed her morning and night time routines. Pt observed comparing herself to others, putting pressure on herself to wake up a certain way, and questioning her needs and wants.   Focused on ways to identify racing thoughts. Encouraged pt to write down thoughts when they become irrational and anxiety driven.     Current symptoms:  Depression: dysphoric mood.  Anxiety: excessive worrying and restlessness.panic, gi issues  Sleep:  denied .  Ashley:  denies.  Psychosis: denies .    Will continue to follow. Pt aware to contact sw for any additional needs that may occur prior to next session.  Therapeutic Intervention/Techniques: behavior modification, insight oriented, interactive, and supportive; relevant to diagnosis, patient responds to this modality    Risk Parameters:  Patient reports no suicidal ideation  Patient reports no homicidal ideation  Patient reports no self-injurious behavior  Patient reports no violent behavior    Diagnosis:   1. Adjustment disorder with mixed anxiety and depressed mood        2. LUCIA (generalized anxiety disorder)        3. MDD (major depressive disorder), recurrent episode, mild                      Return to Clinic: 1 week, as scheduled  Counseling time: 45  -Call to report any worsening of  symptoms or problems associated with medication.  - Pt instructed to go to ER if thoughts of harming self or others arise.   -Supportive therapy and psychoeducation provided  -Pt instructed to call clinic, 911 or go to nearest emergency room if sxs worsen or pt is in crisis. The pt expresses understanding.   Each patient to whom he or she provides medical services by telemedicine is:  (1) informed of the relationship between the physician and patient and the respective role of any other health care provider with respect to management of the patient; and (2) notified that he or she may decline to receive medical services by telemedicine and may withdraw from such care at any time.

## 2024-08-15 ENCOUNTER — PATIENT MESSAGE (OUTPATIENT)
Dept: PSYCHIATRY | Facility: CLINIC | Age: 24
End: 2024-08-15
Payer: COMMERCIAL

## 2024-08-15 DIAGNOSIS — F41.9 ANXIETY AND DEPRESSION: ICD-10-CM

## 2024-08-15 DIAGNOSIS — F32.A ANXIETY AND DEPRESSION: ICD-10-CM

## 2024-08-15 NOTE — TELEPHONE ENCOUNTER
No care due was identified.  [facility] Embedded Care Due Messages. Reference number: [id number].   8/15/2024 11:31:03 AM CDT

## 2024-08-16 RX ORDER — FLUOXETINE HYDROCHLORIDE 20 MG/1
20 CAPSULE ORAL DAILY
Qty: 90 CAPSULE | Refills: 1 | Status: SHIPPED | OUTPATIENT
Start: 2024-08-16 | End: 2025-08-16

## 2024-08-16 NOTE — TELEPHONE ENCOUNTER
Refill Routing Note   Medication(s) are not appropriate for processing by [facility] for the following reason(s):        New or recently adjusted medication    [facility] action(s):  Defer               Appointments  past 12m or future 3m with PCP    Date Provider   Last Visit   7/9/2024 Mj Leon, DO   Next Visit   Visit date not found Mj Leon, DO   ED visits in past 90 days: 0        Note composed:11:30 PM 08/15/2024

## 2024-08-19 ENCOUNTER — OFFICE VISIT (OUTPATIENT)
Dept: PSYCHIATRY | Facility: CLINIC | Age: 24
End: 2024-08-19
Payer: COMMERCIAL

## 2024-08-19 DIAGNOSIS — F41.1 GAD (GENERALIZED ANXIETY DISORDER): Primary | ICD-10-CM

## 2024-08-19 DIAGNOSIS — F33.0 MDD (MAJOR DEPRESSIVE DISORDER), RECURRENT EPISODE, MILD: ICD-10-CM

## 2024-08-19 PROCEDURE — 90834 PSYTX W PT 45 MINUTES: CPT | Mod: 95,,, | Performed by: SOCIAL WORKER

## 2024-08-19 PROCEDURE — 3044F HG A1C LEVEL LT 7.0%: CPT | Mod: CPTII,95,, | Performed by: SOCIAL WORKER

## 2024-08-19 NOTE — PROGRESS NOTES
"Individual Psychotherapy (PhD/LCSW)    08/19/2024    Interim Events/Subjective Report/Content of Current Session:  follow-up appointment.    Pt is a 23 y.o. female with past psychiatric hx of  adjustment do and lucia who presents for follow-up treatment.  Reviewed previous week. Recently celebrated her mothers birthday. Pt continues to have difficult relationship with mother as well as relationship mother is in. Focused on being in that environment with mother and having feelings in "my body that I didn't know what to do with." Discussed ways to approach mood interferences and how pt was able to re-stabilize herself.   Pt stated she is concerned about how she is going to feel not being in school for the first time. Reviewed what pt is focused on for this break vs comparing herself to others in her life that are starting school this semester.   Pt focusing on avoidance response. Having difficulty with identifying concept per pt. Pt and sw discussed further and resources provided for additional information to be researched in between sessions per pt request.     Current symptoms:  Depression: dysphoric mood.  Anxiety: excessive worrying and restlessness.panic, gi issues  Sleep:  denied .  Ashley:  denies.  Psychosis: denies .    Will continue to follow. Pt aware to contact sw for any additional needs that may occur prior to next session.  Therapeutic Intervention/Techniques: behavior modification, insight oriented, interactive, and supportive; relevant to diagnosis, patient responds to this modality    Risk Parameters:  Patient reports no suicidal ideation  Patient reports no homicidal ideation  Patient reports no self-injurious behavior  Patient reports no violent behavior    Diagnosis:   1. LUCIA (generalized anxiety disorder)        2. MDD (major depressive disorder), recurrent episode, mild                        Return to Clinic: 1 week, as scheduled  Counseling time: 45  -Call to report any worsening of symptoms or " problems associated with medication.  - Pt instructed to go to ER if thoughts of harming self or others arise.   -Supportive therapy and psychoeducation provided  -Pt instructed to call clinic, 911 or go to nearest emergency room if sxs worsen or pt is in crisis. The pt expresses understanding.   Each patient to whom he or she provides medical services by telemedicine is:  (1) informed of the relationship between the physician and patient and the respective role of any other health care provider with respect to management of the patient; and (2) notified that he or she may decline to receive medical services by telemedicine and may withdraw from such care at any time.

## 2024-08-26 ENCOUNTER — PATIENT MESSAGE (OUTPATIENT)
Dept: PSYCHIATRY | Facility: CLINIC | Age: 24
End: 2024-08-26
Payer: COMMERCIAL

## 2024-09-09 ENCOUNTER — OFFICE VISIT (OUTPATIENT)
Dept: PSYCHIATRY | Facility: CLINIC | Age: 24
End: 2024-09-09
Payer: COMMERCIAL

## 2024-09-09 DIAGNOSIS — F33.0 MDD (MAJOR DEPRESSIVE DISORDER), RECURRENT EPISODE, MILD: ICD-10-CM

## 2024-09-09 DIAGNOSIS — F41.1 GAD (GENERALIZED ANXIETY DISORDER): Primary | ICD-10-CM

## 2024-09-09 PROCEDURE — 90834 PSYTX W PT 45 MINUTES: CPT | Mod: 95,,, | Performed by: SOCIAL WORKER

## 2024-09-09 PROCEDURE — 3044F HG A1C LEVEL LT 7.0%: CPT | Mod: CPTII,95,, | Performed by: SOCIAL WORKER

## 2024-09-09 NOTE — PROGRESS NOTES
"Individual Psychotherapy (PhD/LCSW)    09/09/2024    Interim Events/Subjective Report/Content of Current Session:  follow-up appointment.    Pt is a 23 y.o. female with past psychiatric hx of  adjustment do and lucia who presents for follow-up treatment.  Reviewed previous week. Identified having increased times of not being able to wake up feeling rested. Discussed this being difficult to manage. Denied this feeling like she is depressed.   Pt stated she is sleeping at night well. Stated "I want to do better." Stated that she feels this could be correlated to her 2 yr anniversary of fathers death. Recalled various accounts of her fathers end of life process. Took time to discuss her feelings and memories.     Pt discussed symptoms related to ADHD such as diffculty with executive fx and procrastination. Tendencies of OCP[employer] also present. Continues to exhibit thoughts of what others are thinking about her. Discussed what  Current symptoms:  Depression: dysphoric mood and hypersomnia.  Anxiety: excessive worrying, restlessness, and obsessions/compulsions.panic, gi issues  Sleep:  trouble waking up .  Ashley:  denies.  Psychosis: denies .    Will continue to follow. Pt aware to contact  for any additional needs that may occur prior to next session.  Therapeutic Intervention/Techniques: behavior modification, insight oriented, interactive, and supportive; relevant to diagnosis, patient responds to this modality    Risk Parameters:  Patient reports no suicidal ideation  Patient reports no homicidal ideation  Patient reports no self-injurious behavior  Patient reports no violent behavior    Diagnosis:   1. LUCIA (generalized anxiety disorder)        2. MDD (major depressive disorder), recurrent episode, mild            Return to Clinic: 1 week, as scheduled  Counseling time: 45  -Call to report any worsening of symptoms or problems associated with medication.  - Pt instructed to go to ER if thoughts of harming self or others " arise.   -Supportive therapy and psychoeducation provided  -Pt instructed to call clinic, 911 or go to nearest emergency room if sxs worsen or pt is in crisis. The pt expresses understanding.   Each patient to whom he or she provides medical services by telemedicine is:  (1) informed of the relationship between the physician and patient and the respective role of any other health care provider with respect to management of the patient; and (2) notified that he or she may decline to receive medical services by telemedicine and may withdraw from such care at any time.

## 2024-09-13 ENCOUNTER — PATIENT MESSAGE (OUTPATIENT)
Dept: PSYCHIATRY | Facility: CLINIC | Age: 24
End: 2024-09-13
Payer: COMMERCIAL

## 2024-09-16 ENCOUNTER — OFFICE VISIT (OUTPATIENT)
Dept: PSYCHIATRY | Facility: CLINIC | Age: 24
End: 2024-09-16
Payer: COMMERCIAL

## 2024-09-16 DIAGNOSIS — F33.0 MDD (MAJOR DEPRESSIVE DISORDER), RECURRENT EPISODE, MILD: ICD-10-CM

## 2024-09-16 DIAGNOSIS — F41.1 GAD (GENERALIZED ANXIETY DISORDER): Primary | ICD-10-CM

## 2024-09-16 PROCEDURE — 90834 PSYTX W PT 45 MINUTES: CPT | Mod: 95,,, | Performed by: SOCIAL WORKER

## 2024-09-16 PROCEDURE — 3044F HG A1C LEVEL LT 7.0%: CPT | Mod: CPTII,95,, | Performed by: SOCIAL WORKER

## 2024-09-18 ENCOUNTER — OFFICE VISIT (OUTPATIENT)
Dept: PSYCHIATRY | Facility: CLINIC | Age: 24
End: 2024-09-18
Payer: COMMERCIAL

## 2024-09-18 VITALS
HEIGHT: 68 IN | BODY MASS INDEX: 25.07 KG/M2 | SYSTOLIC BLOOD PRESSURE: 118 MMHG | WEIGHT: 165.44 LBS | DIASTOLIC BLOOD PRESSURE: 78 MMHG | HEART RATE: 66 BPM

## 2024-09-18 DIAGNOSIS — F41.9 ANXIETY AND DEPRESSION: ICD-10-CM

## 2024-09-18 DIAGNOSIS — F32.A ANXIETY AND DEPRESSION: ICD-10-CM

## 2024-09-18 DIAGNOSIS — F41.1 GAD (GENERALIZED ANXIETY DISORDER): ICD-10-CM

## 2024-09-18 PROCEDURE — 3044F HG A1C LEVEL LT 7.0%: CPT | Mod: CPTII,S$GLB,, | Performed by: NURSE PRACTITIONER

## 2024-09-18 PROCEDURE — 99999 PR PBB SHADOW E&M-EST. PATIENT-LVL III: CPT | Mod: PBBFAC,,, | Performed by: NURSE PRACTITIONER

## 2024-09-18 PROCEDURE — 90792 PSYCH DIAG EVAL W/MED SRVCS: CPT | Mod: S$GLB,,, | Performed by: NURSE PRACTITIONER

## 2024-09-18 PROCEDURE — 3074F SYST BP LT 130 MM HG: CPT | Mod: CPTII,S$GLB,, | Performed by: NURSE PRACTITIONER

## 2024-09-18 PROCEDURE — 3078F DIAST BP <80 MM HG: CPT | Mod: CPTII,S$GLB,, | Performed by: NURSE PRACTITIONER

## 2024-09-18 PROCEDURE — 1159F MED LIST DOCD IN RCRD: CPT | Mod: CPTII,S$GLB,, | Performed by: NURSE PRACTITIONER

## 2024-09-18 PROCEDURE — 1160F RVW MEDS BY RX/DR IN RCRD: CPT | Mod: CPTII,S$GLB,, | Performed by: NURSE PRACTITIONER

## 2024-09-18 RX ORDER — FLUOXETINE HYDROCHLORIDE 40 MG/1
40 CAPSULE ORAL DAILY
Qty: 90 CAPSULE | Refills: 1 | Status: SHIPPED | OUTPATIENT
Start: 2024-09-18 | End: 2025-09-18
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
## 2024-09-23 ENCOUNTER — OFFICE VISIT (OUTPATIENT)
Dept: PSYCHIATRY | Facility: CLINIC | Age: 24
End: 2024-09-23
Payer: COMMERCIAL

## 2024-09-23 ENCOUNTER — PATIENT MESSAGE (OUTPATIENT)
Dept: PSYCHIATRY | Facility: CLINIC | Age: 24
End: 2024-09-23
Payer: COMMERCIAL

## 2024-09-23 DIAGNOSIS — F33.0 MDD (MAJOR DEPRESSIVE DISORDER), RECURRENT EPISODE, MILD: ICD-10-CM

## 2024-09-23 DIAGNOSIS — F41.1 GAD (GENERALIZED ANXIETY DISORDER): Primary | ICD-10-CM

## 2024-09-23 PROCEDURE — 3044F HG A1C LEVEL LT 7.0%: CPT | Mod: CPTII,95,, | Performed by: SOCIAL WORKER

## 2024-09-23 PROCEDURE — 90834 PSYTX W PT 45 MINUTES: CPT | Mod: 95,,, | Performed by: SOCIAL WORKER

## 2024-09-23 NOTE — PROGRESS NOTES
"Individual Psychotherapy (PhD/LCSW)    09/23/2024    Interim Events/Subjective Report/Content of Current Session:  follow-up appointment.    Pt is a 23 y.o. female with past psychiatric hx of  adjustment do and lucia who presents for follow-up treatment.  Reviewed previous week. Pt stated she met with new prescriber. Pt stated that she had a positive interaction and will be having change in dosage. Pt stated she has been having a "high anxiety week." Pt stated that she felt anxiety within her body and was able to use progressive muscle relaxation which allowed pt to decrease interrupting anxiety. Pt stated she has been able to apply more tools to anxiety in a way that has been difficult for pt in recent weeks. Pt using cognitive model as well as grounding at this time. Pt stated she was confused about cognitive model and wanted to review in session. Role play using model practiced.    Pt has times of perfectionism that interrupts pt utilizing tools to address management of emotions. Time spent today to allow permission of normal human emotion.     Current symptoms:  Depression: dysphoric mood and hypersomnia. Anhedonia, lack of self worth, lack of motivation  Anxiety: excessive worrying, restlessness, and obsessions/compulsions.panic, gi issues, irrational thinking, racing thoughts.   Sleep:  trouble waking up .  Ashley:  denies.  Psychosis: denies .    Will continue to follow. Pt aware to contact sw for any additional needs that may occur prior to next session.  Therapeutic Intervention/Techniques: behavior modification, insight oriented, interactive, and supportive; relevant to diagnosis, patient responds to this modality    Risk Parameters:  Patient reports no suicidal ideation  Patient reports no homicidal ideation  Patient reports no self-injurious behavior  Patient reports no violent behavior    Diagnosis:   No diagnosis found.        Return to Clinic: 1 week, as scheduled  Counseling time: 45  -Call to report " any worsening of symptoms or problems associated with medication.  - Pt instructed to go to ER if thoughts of harming self or others arise.   -Supportive therapy and psychoeducation provided  -Pt instructed to call clinic, 911 or go to nearest emergency room if sxs worsen or pt is in crisis. The pt expresses understanding.   Each patient to whom he or she provides medical services by telemedicine is:  (1) informed of the relationship between the physician and patient and the respective role of any other health care provider with respect to management of the patient; and (2) notified that he or she may decline to receive medical services by telemedicine and may withdraw from such care at any time.

## 2024-09-25 ENCOUNTER — PATIENT MESSAGE (OUTPATIENT)
Dept: PSYCHIATRY | Facility: CLINIC | Age: 24
End: 2024-09-25
Payer: COMMERCIAL

## 2024-10-29 ENCOUNTER — OFFICE VISIT (OUTPATIENT)
Dept: PSYCHIATRY | Facility: CLINIC | Age: 24
End: 2024-10-29
Payer: COMMERCIAL

## 2024-10-29 DIAGNOSIS — F33.0 MILD EPISODE OF RECURRENT MAJOR DEPRESSIVE DISORDER: ICD-10-CM

## 2024-10-29 DIAGNOSIS — F41.1 GAD (GENERALIZED ANXIETY DISORDER): Primary | ICD-10-CM

## 2024-10-29 PROBLEM — F41.9 ANXIETY AND DEPRESSION: Status: RESOLVED | Noted: 2024-05-09 | Resolved: 2024-10-29

## 2024-10-29 PROBLEM — F32.A ANXIETY AND DEPRESSION: Status: RESOLVED | Noted: 2024-05-09 | Resolved: 2024-10-29

## 2024-10-29 PROCEDURE — 1160F RVW MEDS BY RX/DR IN RCRD: CPT | Mod: CPTII,95,, | Performed by: NURSE PRACTITIONER

## 2024-10-29 PROCEDURE — 3044F HG A1C LEVEL LT 7.0%: CPT | Mod: CPTII,95,, | Performed by: NURSE PRACTITIONER

## 2024-10-29 PROCEDURE — 99214 OFFICE O/P EST MOD 30 MIN: CPT | Mod: 95,,, | Performed by: NURSE PRACTITIONER

## 2024-10-29 PROCEDURE — 1159F MED LIST DOCD IN RCRD: CPT | Mod: CPTII,95,, | Performed by: NURSE PRACTITIONER

## 2024-11-06 ENCOUNTER — DOCUMENTATION ONLY (OUTPATIENT)
Dept: PSYCHIATRY | Facility: CLINIC | Age: 24
End: 2024-11-06
Payer: COMMERCIAL

## 2024-11-06 NOTE — PROGRESS NOTES
Patient has been Dismissed from Cayla Araujo LCSW at [facility] care as of 10/7/24

## 2024-11-19 ENCOUNTER — E-VISIT (OUTPATIENT)
Dept: FAMILY MEDICINE | Facility: CLINIC | Age: 24
End: 2024-11-19
Payer: COMMERCIAL

## 2024-11-19 DIAGNOSIS — F41.9 ANXIETY AND DEPRESSION: Primary | ICD-10-CM

## 2024-11-19 DIAGNOSIS — F32.A ANXIETY AND DEPRESSION: Primary | ICD-10-CM

## 2024-11-20 NOTE — PROGRESS NOTES
"   Patient ID: Sophia Ann Boeckl is a 24 y.o. female.    E-VISIT     Chief Complaint: General Illness (Entered automatically based on patient selection in [facility] [employer].)    The patient initiated a request through [facility] [employer] on 11/19/2024 for evaluation and management with a chief complaint of General Illness (Entered automatically based on patient selection in [employer].)     I evaluated the questionnaire submission on 11/19/2024 .    Assessment and plan     1. Anxiety and depression  - Ambulatory referral/consult to Psychiatry; Future          [facility]     [facility]    11/19/2024  3:07 PM CST - Filed by Patient   What do you need help with? Depression   Do you agree to participate in an E-Visit? Yes   If you have any of the following symptoms, please present to your local emergency room or call 911:  I acknowledge   Medication requests for narcotics will not be addressed via an E-Visit.  Please schedule an appointment. I acknowledge   Select all that apply: None of the above   What is the main issue you would like addressed today? I would like a referral for a new therapist at the [facility]   Fear of embarrassment causes me to avoid doing things or speaking to people. No   I avoid activities in which I am the center of attention. No   Being embarrassed or looking stupid are among my worst fears. No   I would like to address: Other concerns related to Anxiety or Depression   By selecting "I understand," you acknowledge that the answers that you provide for this questionnaire may not be immediately viewed by your provider or your care team. If you are personally dealing with suicidal thoughts or a crisis, please consider contacting your provider's office.  If your provider is not available, please consider taking action by: calling 911 or the National Suicide Prevention Lifeline any day, any time at 1-707.132.4868 or texting SIGNS to 064619 for 24/7 anonymous, free crisis " counseling. I understand   Over the last 2 weeks, how often have you been bothered by the following problems?   Little interest or pleasure in doing things Several days   Feeling down, depressed, or hopeless Nearly every day   PHQ-2 Score (range: 0 - 6) 4 (Further screening recommended)   Feeling nervous, anxious, on edge Nearly everyday   Not being able to stop or control worrying Nearly everyday   Worrying too much about different things Nearly everyday   Trouble relaxing Nearly everyday   Being so restless that its hard to sit still Several days   Becoming easily annoyed or irritable Several days   Feeling afraid as if something awful might happen Nearly everyday   If you marked you are experiencing any of the aforementioned problems, how difficult have these made it for you to do your work, take care of things at home, or get along with other people? Extremely difficult   TOTAL SCORE: (range: 0 - 21) 17   Please describe your symptoms Panic, anxiety, executive dysfunction, lack of energy and motivationa   How severe are your symptoms? Moderate   Have you had these symptoms before? Yes   How long have you been having these symptoms? For more than a month   Please list any medications or treatments you have used for your condition and indicate if it was effective or not. Zoloft did not work, prozac 20mg didnt help, prozac 40mg has helped slightly with energy, talk therapy helps with symptoms but i would like a new therapist   What makes this feel better? Talk therapy   What makes this feel worse? Stressful situations   Are these symptoms related to a condition that you currently have? No   Please describe any probable cause for these symptoms Diagnosed JORGE and depression   Provide any additional information you feel is important. I just need a referall for a new therapist preferably that specializes in anxiety and executive dysfunction   Please attach any relevant images or files    Are you able to take your  vital signs? No          No follow-ups on file.    E-Visit Time Tracking:    Day 1 Time (in minutes): 11    Total Time (in minutes): 11

## 2024-12-02 NOTE — PROGRESS NOTES
Outpatient Psychotherapy Initial Visit  12/16/2024    History of Presenting Illness:    Pt is a  24 y.o. female referred by Mj Leon DO for anxiety. Pt was seen, examined and chart was reviewed. Pt reviewed and agreed to informed consent and limits of confidentiality. LPC met with Pt.     Pt reported recently dropped out of school, depressed, high anxiety. Pt reported family had concerns for her.     Pt has a hx of depression, panic attacks.    Pt reported death of father in 2022, issues with college in fall 2023, money issues with college 2023, broke trust with family, extreme procrastination, and mother has alcohol issues.     LPC and Pt discussed family hx. Pt was raised by mother. Pt has 1 sibling. Pt described childhood as chaotic. Pt reported father drank. Pt reported mother always picked bad men.     LPC and Pt discussed family history with mental health issues/substance use. Pt reported both parents alcohol issues. Pt stated  ADHD with father and sister.     LPC and Pt discussed relationships. Pt reported boyfriend on and off. Pt reported being together for 2 years.   LPC and Pt discussed children. Pt denied.    Pt identified protective factors of family, friends, and boyfriend.  Pt endorsed coping skills of meditation, reading, journaling, Anabaptist, progressive muscle relaxation, writing things down, re-framing, and Mandaeism.   Pt identified strengths of self-aware, patient, smart, and empathetic.    LPC and Pt discussed employment status. 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   LPC and Pt discussed legal issues. Pt denied.  LPC and Pt discussed  hx. Pt denied.     Goals: anxiety, depression, procrastination issues, and increasing energy.  LPC engaged in rapport building, psychoeducation, and goal setting.  Pt goals are to anxiety, depression, procrastination issues, and increasing energy Pt would benefit from behavior modification, insight oriented, and supportive therapies.  Pt  "receptive to psychotherapy. LPC assisted with scheduling follow ups.    Current symptoms:  Depression: anhedonia, hypersomnia, worthlessness/guilt, and fatigue.  Anxiety: excessive worrying and muscle tension.  Sleep:  Pt reported 8-10 hours .  Nightmares: Pt reported 2-3 times a week. Pt explained self-esteem/self-worth, fear/anxiety.  Panic attacks: n/a  Ashley:  racing thoughts or rumination.  Psychosis: denies .  Motivation/energy: Pt reported 5/10  Appetite: Pt denied issues.   Hygiene: Pt reported every 2-3 days showering.   Risk assessment:    Suicidal Ideation and Risk:   Pt denied current or history of related symptoms: no    [place]-Suicide Severity Rating Scale  In the last two weeks     1. Wish to be Dead: Have you ever wished you were dead or not alive anymore, or wish to fall asleep and not wake up?: No  2. Suicidal Thoughts: Have you had any thoughts of killing yourself?: No  3. Suicidal Thoughts with Method (withoutSpecific Plan or Intent to Act): Have you been thinking about how you might kill yourself? : No  4. Suicidal Intent (without Specific Plan): Have you had these thoughts and had some intention of acting on them?: No  5. Suicide Intent with Specific Plan: Have you started to work out or worked out the details of how to kill yourself? Do you intend to carry out this plan?: No  6. Suicide Behavior Question: Have you ever done anything, started to do anything, or prepare to do anything to end your life?: No  If "Yes" to question 6: How long ago did you do any of these?: Between a week and a year ago? No      Homicidal/Violent Ideation and Risk:   Pt denied current or history of related symptoms.  Pt advised to call [phone] or present to the nearest ED if they experience suicidal or homicidal ideation, plan or intent.      Past Medical History:   Diagnosis Date    Deviated septum          Current Outpatient Medications:     FLUoxetine 40 MG capsule, Take 1 capsule (40 mg total) by mouth once " "daily., Disp: 90 capsule, Rfl: 1    glycopyrronium tosylate 2.4 % Towl, Use 1 towel to each axilla 2 times a daily., Disp: 30 each, Rfl: 1    levonorgestreL (KYLEENA) 17.5 mcg/24 hrs (5 yrs) 19.5 mg IUD, 1 each by Intrauterine route once. 2/2020, Disp: , Rfl:     multivit with calcium,iron,min (WOMEN'S DAILY MULTIVITAMIN ORAL), Take by mouth once daily at 6am., Disp: , Rfl:     sumatriptan (IMITREX) 50 MG tablet, Take 1 tablet (50 mg total) by mouth every 8 (eight) hours. Prn headache, Disp: 12 tablet, Rfl: 2    Psychiatric History:    Previous Psychiatric Outpatient Treatment:  Yes - Pt reported therapy in the past.   Previous Psychiatric Hospitalizations:  No  Self-harm: Pt denied.   Previous Suicide Attempts:  No  History of Trauma:  Yes   Access to a Firearm:  Yes. Pt reported, "I don't know where it is."     Substance Use History:  Tobacco/Nicotine:  No   Alcohol: social. Pt reported once a week to once every 2 weeks.   Illicit Substances: No  Misuse of Prescription Medications:  No  Caffeine: Yes - Pt reported 2-3. Pt explained 1 energy drink.   Other addictive tendencies: n/a        PSYCHOSOCIAL AND ENVIRONMENTAL STRESSORS:  Pt endorsed stressors of mental health, getting back into school, money issues, and lack of purpose.    Clinical Assessment:   Identified symptoms to address in tx:      Ability to adhere to plan: cooperative     Rationale for employing these interactive techniques: Applicable to diagnosis     Diagnosis(es):     1. JORGE (generalized anxiety disorder)        2. MDD (major depressive disorder), recurrent episode, mild            Plan   Treatment Goals:  Specify outcomes written in observable, behavioral terms:   Anxiety: acquiring relapse prevention skills, eliminating avoidance (specify tasks that take time to complete and thought), reducing negative automatic thoughts, reducing physical symptoms of anxiety, and reducing time spent worrying (<30 minutes/day)  Depression: acquiring relapse " prevention skills, increasing energy, increasing motivation, reducing excessive guilt, reducing fatigue, and reducing negative automatic thoughts    Treatment Plan/Recommendations:   The treatment plan and follow up plan were reviewed with the patient.    Pt is to attend supportive psychotherapy sessions.      This author reviewed limits to confidentiality and this author's collaboration with pt's physician. Pt indicated understanding and denied any questions.     Return to Clinic: as scheduled  Counseling time: 60     -Call to report any worsening of symptoms or problems associated with medication.  - Pt instructed to go to ER if thoughts of harming self or others arise.   -Supportive therapy and psychoeducation provided  -Pt instructed to call clinic, 911 or go to nearest emergency room if sxs worsen or pt is in crisis. The pt expresses understanding.      Each patient to whom he or she provides medical services by telemedicine is: (1) informed of the relationship between the physician and patient and the respective role of any other health care provider with respect to management of the patient; and (2) notified that he or she may decline to receive medical services by telemedicine and may withdraw from such care at any time.

## 2024-12-10 ENCOUNTER — OFFICE VISIT (OUTPATIENT)
Dept: URGENT CARE | Facility: CLINIC | Age: 24
End: 2024-12-10
Payer: COMMERCIAL

## 2024-12-10 VITALS
TEMPERATURE: 99 F | DIASTOLIC BLOOD PRESSURE: 71 MMHG | WEIGHT: 165 LBS | OXYGEN SATURATION: 99 % | HEIGHT: 68 IN | BODY MASS INDEX: 25.01 KG/M2 | HEART RATE: 90 BPM | SYSTOLIC BLOOD PRESSURE: 109 MMHG | RESPIRATION RATE: 18 BRPM

## 2024-12-10 DIAGNOSIS — J01.90 ACUTE BACTERIAL SINUSITIS: Primary | ICD-10-CM

## 2024-12-10 DIAGNOSIS — J02.9 SORE THROAT: ICD-10-CM

## 2024-12-10 DIAGNOSIS — B96.89 ACUTE BACTERIAL SINUSITIS: Primary | ICD-10-CM

## 2024-12-10 LAB
CTP QC/QA: YES
MOLECULAR STREP A: NEGATIVE

## 2024-12-10 PROCEDURE — 99213 OFFICE O/P EST LOW 20 MIN: CPT | Mod: S$GLB,,, | Performed by: NURSE PRACTITIONER

## 2024-12-10 PROCEDURE — 87651 STREP A DNA AMP PROBE: CPT | Mod: QW,S$GLB,, | Performed by: NURSE PRACTITIONER

## 2024-12-10 RX ORDER — AMOXICILLIN 500 MG/1
500 CAPSULE ORAL EVERY 12 HOURS
Qty: 14 CAPSULE | Refills: 0 | Status: SHIPPED | OUTPATIENT
Start: 2024-12-10 | End: 2024-12-17

## 2024-12-10 NOTE — PROGRESS NOTES
"Subjective:      Patient ID: Sophia Ann Boeckl is a 24 y.o. female.    Vitals:  height is 5' 8" (1.727 m) and weight is 74.8 kg (165 lb). Her oral temperature is 99.1 °F (37.3 °C). Her blood pressure is 109/71 and her pulse is 90. Her respiration is 18 and oxygen saturation is 99%.     Chief Complaint: Sore Throat (Entered by patient)    Pt came with complaint of sore throat, coughing and nasal drips. Coughing occur mostly when waking up. Painful to swallow. Symptoms persisted for a week  Provider note begins below    Patient reports a sore throat.  She does have throat ulcers.  She has a history of a tonsillectomy and uvulectomy.  She reports a postnasal drip that is thick and green.  No fevers headaches or body aches.  Patient takes L lysine for her aphthous ulcers    Sore Throat   This is a new problem. The current episode started in the past 7 days. The problem has been unchanged. The patient is experiencing no pain. Associated symptoms include coughing. Pertinent negatives include no diarrhea or vomiting. She has tried nothing (ibuprofen and aceta) for the symptoms. The treatment provided mild relief.       Constitution: Negative for sweating, fatigue and fever.   HENT:  Positive for postnasal drip and sore throat.    Cardiovascular:  Negative for chest pain and sob on exertion.   Respiratory:  Positive for cough.    Gastrointestinal:  Negative for nausea, vomiting, constipation and diarrhea.      Objective:     Physical Exam   Constitutional: She is oriented to person, place, and time.   HENT:   Head: Normocephalic and atraumatic.   Nose: Rhinorrhea and congestion present.   Mouth/Throat: Oral lesions present. Posterior oropharyngeal erythema present. No oropharyngeal exudate, posterior oropharyngeal edema, tonsillar abscesses or cobblestoning.       Cardiovascular: Normal rate.   Pulmonary/Chest: Effort normal. No respiratory distress.   Abdominal: Normal appearance.   Neurological: She is alert and oriented " to person, place, and time.   Skin: Skin is warm and dry.   Psychiatric: Her behavior is normal. Mood normal.         Results for orders placed or performed in visit on 12/10/24   POCT Strep A, Molecular    Collection Time: 12/10/24  4:32 PM   Result Value Ref Range    Molecular Strep A, POC Negative Negative     Acceptable Yes       Assessment:     1. Acute bacterial sinusitis    2. Sore throat        Plan:   Patient will take L lysine for the ulcer.  States magic mouthwash does not help.  Antibiotics   Follow up if not improving  Strep test negative        Acute bacterial sinusitis  -     amoxicillin (AMOXIL) 500 MG capsule; Take 1 capsule (500 mg total) by mouth every 12 (twelve) hours. for 7 days  Dispense: 14 capsule; Refill: 0    Sore throat  -     POCT Strep A, Molecular

## 2024-12-16 ENCOUNTER — CLINICAL SUPPORT (OUTPATIENT)
Dept: PSYCHIATRY | Facility: CLINIC | Age: 24
End: 2024-12-16
Payer: COMMERCIAL

## 2024-12-16 DIAGNOSIS — F41.9 ANXIETY AND DEPRESSION: ICD-10-CM

## 2024-12-16 DIAGNOSIS — F32.A ANXIETY AND DEPRESSION: ICD-10-CM

## 2024-12-16 DIAGNOSIS — F33.0 MDD (MAJOR DEPRESSIVE DISORDER), RECURRENT EPISODE, MILD: ICD-10-CM

## 2024-12-16 DIAGNOSIS — F41.1 GAD (GENERALIZED ANXIETY DISORDER): Primary | ICD-10-CM

## 2024-12-16 PROCEDURE — 90791 PSYCH DIAGNOSTIC EVALUATION: CPT | Mod: S$GLB,,, | Performed by: COUNSELOR

## 2024-12-16 PROCEDURE — 99999 PR PBB SHADOW E&M-EST. PATIENT-LVL I: CPT | Mod: PBBFAC,,, | Performed by: COUNSELOR

## 2024-12-17 ENCOUNTER — OFFICE VISIT (OUTPATIENT)
Dept: PSYCHIATRY | Facility: CLINIC | Age: 24
End: 2024-12-17
Payer: COMMERCIAL

## 2024-12-17 DIAGNOSIS — F33.0 MILD EPISODE OF RECURRENT MAJOR DEPRESSIVE DISORDER: ICD-10-CM

## 2024-12-17 DIAGNOSIS — F41.9 ANXIETY AND DEPRESSION: ICD-10-CM

## 2024-12-17 DIAGNOSIS — F32.A ANXIETY AND DEPRESSION: ICD-10-CM

## 2024-12-17 DIAGNOSIS — F41.1 GAD (GENERALIZED ANXIETY DISORDER): Primary | ICD-10-CM

## 2024-12-17 PROCEDURE — 99214 OFFICE O/P EST MOD 30 MIN: CPT | Mod: 95,,, | Performed by: NURSE PRACTITIONER

## 2024-12-17 PROCEDURE — 1159F MED LIST DOCD IN RCRD: CPT | Mod: CPTII,95,, | Performed by: NURSE PRACTITIONER

## 2024-12-17 PROCEDURE — 3044F HG A1C LEVEL LT 7.0%: CPT | Mod: CPTII,95,, | Performed by: NURSE PRACTITIONER

## 2024-12-17 PROCEDURE — 1160F RVW MEDS BY RX/DR IN RCRD: CPT | Mod: CPTII,95,, | Performed by: NURSE PRACTITIONER

## 2024-12-17 NOTE — PROGRESS NOTES
Outpatient Psychiatry Follow-Up Visit    Clinical Status of Patient: Outpatient (Virtual)  12/17/2024    [address] [place] [address] [address] RAHUL DOLAN [id number]  [phone] (M)  [phone] (H)  Visit type: Virtual visit with synchronous audio and video  Each patient to whom he or she provides medical services by telemedicine is:  (1) informed of the relationship between the physician and patient and the respective role of any other health care provider with respect to management of the patient; and (2) notified that he or she may decline to receive medical services by telemedicine and may withdraw from such care at any time.       Chief Complaint: Pt is a    25 yo F   who presents today for a follow-up. Met with patient.       Interval History and Content of Current Session:  Interim Events/Subjective Report/Content of Current Session:  follow up appointment.    Pt is a 25 yo F  with past psychiatric hx of lucia, mdd     who presents for follow up treatment. She is taking Prozac 40mg QD which has been therapeutic and well-tolerated.    Between sessions, the patient reports improvement in anxiety, which is now well-managed. They deny experiencing any major exacerbations and report good stress tolerance. The patient does not report excessive or unproductive worrying and denies somatic symptoms of anxiety, such as restlessness, muscle tension, or chest tightness. They also deny excessive irritability and indicate a good ability to manage frustration. The patient reports sleeping well, with good appetite and energy levels. Overall, they are stable and functioning at a high level.    Between visits, the patient reports that their depression is well-controlled, with no signs of decompensation since the last session. They describe a good mood and deny experiencing anhedonia, hopelessness, or feelings of worthlessness. The patient reports maintaining a good level of motivation and denies apathy or psychomotor slowing. Additionally, they  "note having a healthy appetite, stable energy levels, and good-quality sleep. Overall, they remain stable and highly functional.    Past Psychiatric hx:  Pt. is a 22 yo F  with a past psychiatric hx of  "anxiety and depression"  presenting to the clinic for an initial evaluation and treatment. PMHx outlined below. Pt presented to me taking Prozac 20mg QD thru her PCP x 1 month and notes past trials of Zoloft (made anxiety worse). No hx of inpatient history or past suicidal behaviors.      Pt notes recent struggles with depression that have progressively worsened for th last 6-12 months. Since last session, pt notes that their mood has been progressively worsening. Pt reports feeling sad and down with no identifiable trigger. They describe increasing anhedonia, apathy, and lack of motivation. She often feels that she could remain in bed most of the day. This negatively impacts her ability to function. Pt sometimes feels worthless but denies feeling hopeless. Denies SI/HI. Sleep, energy level, and focus has been negatively impacted. Pt notes that this has started to decrease overall quality of life.      Pt notes longstanding history of worry, dating to childhood. Recently, there has been an increase in generalized, unproductive worry. Pt often ruminates and over-analyzes. They describe an increase in feelings of restlessness, tension, and agitation. Stress and frustration tolerance are poor. Sleep has been negatively impacted.     Pt lost her father to cancer two years ago.      Sees Cayla Araujo in our clinic routinely.      Past Medical hx:   Past Medical History:   Diagnosis Date    Deviated septum         Interim hx:    Denies suicidal/homicidal ideations.  Denies hopelessness/worthlessness.    Denies auditory/visual hallucinations      Denies substance use      Review of Systems   PSYCHIATRIC: Pertinent items are noted in the narrative.        CONSTITUTIONAL: weight stable        M/S: no pain today         " ENT: no allergies noted today        ABD: no n/v/d     Past Medical, Family and Social History: The patient's past medical, family and social history have been reviewed and updated as appropriate within the electronic medical record. See encounter notes.    Compliance: yes      Side effects: tolerates     Risk Parameters:  Patient reports no suicidal ideation  Patient reports no homicidal ideation  Patient reports no self-injurious behavior  Patient reports no violent behavior     Exam (detailed: at least 9 elements; comprehensive: all 15 elements)   Constitutional  Vitals:  Most recent vital signs, dated less than 90 days prior to this appointment, were reviewed.       General:  unremarkable, age appropriate, casual attire, good eye contact, good rapport       Musculoskeletal  Muscle Strength/Tone:  no flaccidity, no tremor    Gait & Station:  normal      Psychiatric                       Speech:  normal tone, normal rate, rhythm, and volume   Mood & Affect:   Euthymic, congruent, appropriate         Thought Process:   Goal directed; Linear    Associations:   intact   Thought Content:   No SI/HI, delusions, or paranoia, no AV/VH   Insight & Judgement:   Good, adequate to circumstances   Orientation:   grossly intact; alert and oriented x 4    Memory:  intact for content of interview    Language:  grossly intact, can repeat    Attention Span  : Grossly intact for content of interview   Fund of Knowledge:   intact and appropriate to age and level of education        Assessment and Diagnosis   Status/Progress: Based on the examination today, the patient's problem(s) is/are under fair control.  New problems have not been presented today. Comorbidities are not currently complicating management of the primary condition.      Impression:       Pt is a 25 yo F  with past psychiatric hx of lucia, mdd     who presents for follow up treatment. She is taking Prozac 40mg QD which has been therapeutic and  well-tolerated.    Between sessions, the patient reports improvement in anxiety, which is now well-managed. They deny experiencing any major exacerbations and report good stress tolerance. The patient does not report excessive or unproductive worrying and denies somatic symptoms of anxiety, such as restlessness, muscle tension, or chest tightness. They also deny excessive irritability and indicate a good ability to manage frustration. The patient reports sleeping well, with good appetite and energy levels. Overall, they are stable and functioning at a high level.    Between visits, the patient reports that their depression is well-controlled, with no signs of decompensation since the last session. They describe a good mood and deny experiencing anhedonia, hopelessness, or feelings of worthlessness. The patient reports maintaining a good level of motivation and denies apathy or psychomotor slowing. Additionally, they note having a healthy appetite, stable energy levels, and good-quality sleep. Overall, they remain stable and highly functional.        Diagnosis: lucia, mdd    Intervention/Counseling/Treatment Plan   Medication Management:      1. Cont Prozac 40 mg QD for anxiety/depression     4. Call to report any worsening of symptoms or problems with the medication. Pt instructed to go to ER with thoughts of harming self, others     5. Patient given contact # for psychotherapists at [facility] and also instructed she may check with insurance for list of providers.      6. Labs: no new orders         Return to clinic: 6 weeks - self    -Spent 30min face to face with the pt; >50% time spent in counseling   -Supportive therapy and psychoeducation provided  -R/B/SE's of medications discussed with the pt who expresses understanding and chooses to take medications as prescribed.   -Pt instructed to call clinic, 911 or go to nearest emergency room if sxs worsen or pt is in   crisis. The pt expresses  understanding.    Chris Burden, NP

## 2024-12-18 NOTE — PROGRESS NOTES
Individual Psychotherapy (LPC)    1/8/2025  The patient location is:    [facility] [place] [facility] [address] RAHUL DOLAN [id number]     The patient phone number is: [phone]   Visit type: Virtual visit with synchronous audio and video  Each patient to whom he or she provides medical services by telemedicine is:  (1) informed of the relationship between the provider and patient and the respective role of any other health care provider with respect to management of the patient; and (2) notified that he or she may decline to receive medical services by telemedicine and may withdraw from such care at any time.  Crisis Disclaimer: Patient was informed that due to the virtual nature of the visit, that if a crisis develops, protocols will be implemented to ensure patient safety, including but not limited to: 1) Initiating a welfare check with local Law Enforcement, 2) Calling teextee1/National Crisis Hotline, and/or 3) Initiating PEC/CEC procedures.    Interim Events/Subjective Report/Content of Current Session:  follow-up appointment.    Pt is a 24 y.o. female with past psychiatric hx of  anxiety and depression who presents for follow-up treatment.   LPC and PT engaged rapport building. Pt explained going on vacation. Pt  discussed being sick lately.     LPC and Pt processed feelings about not being engaged in school. Pt explained starting school soon. Pt denied general motivation.   Pt processed issues with living with sister. Pt endorsed guilt, and feeling like a burden with family.    LPC and Pt discussed challenging thoughts about being a burden.     LPC and Pt discussed creating a costs-benefits analysis with talking with her sister.     LPC and PT discussed anxiety. Pt denied being very anxious. Pt denied any big stressors.     LPC and PT discussed what have been helpful in the past with increasing energy. LPC and Pt discussed sugar intake.     LPC provided PT resources.     LPC and Pt addressed goals of anxiety and energy.    Goals:  anxiety, depression, procrastination issues, and increasing energy.   Current symptoms:  Depression: fatigue and difficulty concentrating.  Anxiety: denies.  Panic Attacks: n/a  Sleep:  Pt reported being sick .  Flashbacks/nightmares: n/a  Ashley:  denies.  Psychosis: denies .  Hygiene:  Appetite:   Motivation/Energy: Pt reported low energy.   Therapeutic Intervention/Techniques: behavior modification, insight oriented, and supportive; relevant to diagnosis, patient responds to this modality    Will continue to follow.   Pt aware to contact LPC for any additional needs that may occur prior to next session.    Risk Parameters:  Patient reports no suicidal ideation  Patient reports no homicidal ideation  Patient reports no self-injurious behavior  Patient reports no violent behavior    Diagnosis:   1. MDD (major depressive disorder), recurrent episode, mild        2. JORGE (generalized anxiety disorder)            Return to Clinic: 2 weeks  Counseling time: 30  -Call to report any worsening of symptoms or problems associated with medication.  - Pt instructed to go to ER if thoughts of harming self or others arise.   -Supportive therapy and psychoeducation provided  -Pt instructed to call clinic, 911 or go to nearest emergency room if sxs worsen or pt is in crisis. The pt expresses understanding.   Each patient to whom he or she provides medical services by telemedicine is:  (1) informed of the relationship between the physician and patient and the respective role of any other health care provider with respect to management of the patient; and (2) notified that he or she may decline to receive medical services by telemedicine and may withdraw from such care at any time.

## 2025-01-08 ENCOUNTER — PATIENT MESSAGE (OUTPATIENT)
Dept: PSYCHIATRY | Facility: CLINIC | Age: 25
End: 2025-01-08
Payer: COMMERCIAL

## 2025-01-08 ENCOUNTER — CLINICAL SUPPORT (OUTPATIENT)
Dept: PSYCHIATRY | Facility: CLINIC | Age: 25
End: 2025-01-08
Payer: COMMERCIAL

## 2025-01-08 DIAGNOSIS — F41.1 GAD (GENERALIZED ANXIETY DISORDER): ICD-10-CM

## 2025-01-08 DIAGNOSIS — F33.0 MDD (MAJOR DEPRESSIVE DISORDER), RECURRENT EPISODE, MILD: Primary | ICD-10-CM

## 2025-02-16 DIAGNOSIS — F32.A ANXIETY AND DEPRESSION: ICD-10-CM

## 2025-02-16 DIAGNOSIS — F41.9 ANXIETY AND DEPRESSION: ICD-10-CM

## 2025-02-16 RX ORDER — FLUOXETINE HYDROCHLORIDE 20 MG/1
20 CAPSULE ORAL
Qty: 90 CAPSULE | Refills: 1 | OUTPATIENT
Start: 2025-02-16

## 2025-02-16 NOTE — TELEPHONE ENCOUNTER
No care due was identified.  [facility] Embedded Care Due Messages. Reference number: [id number].   2/16/2025 12:20:32 AM CST

## 2025-02-17 NOTE — TELEPHONE ENCOUNTER
Refill Decision Note   Buffy Boeckl  is requesting a refill authorization.  Brief Assessment and Rationale for Refill:  Quick Discontinue     Medication Therapy Plan: Fluoxetine increased to 40 mg on 9/18/24      Comments:     Note composed:6:16 PM 02/16/2025

## 2025-04-06 ENCOUNTER — E-VISIT (OUTPATIENT)
Dept: FAMILY MEDICINE | Facility: CLINIC | Age: 25
End: 2025-04-06
Payer: COMMERCIAL

## 2025-04-06 DIAGNOSIS — L74.510 PRIMARY FOCAL HYPERHIDROSIS OF AXILLA: ICD-10-CM

## 2025-04-06 DIAGNOSIS — F32.A ANXIETY AND DEPRESSION: ICD-10-CM

## 2025-04-06 DIAGNOSIS — F41.9 ANXIETY AND DEPRESSION: ICD-10-CM

## 2025-04-06 DIAGNOSIS — F33.0 MILD EPISODE OF RECURRENT MAJOR DEPRESSIVE DISORDER: ICD-10-CM

## 2025-04-06 DIAGNOSIS — F41.1 GAD (GENERALIZED ANXIETY DISORDER): Primary | ICD-10-CM

## 2025-04-06 RX ORDER — GLYCOPYRRONIUM 2.4 G/100G
CLOTH TOPICAL
Qty: 30 EACH | Refills: 1 | Status: SHIPPED | OUTPATIENT
Start: 2025-04-06 | End: 2025-04-08 | Stop reason: SDUPTHER

## 2025-04-06 NOTE — TELEPHONE ENCOUNTER
Refill Routing Note   Medication(s) are not appropriate for processing by [facility] for the following reason(s):        Outside of protocol    [facility] action(s):  Route               Appointments  past 12m or future 3m with PCP    Date Provider   Last Visit   11/19/2024 Mj Leon, DO   Next Visit   4/6/2025 Mj Leon, DO   ED visits in past 90 days: 0        Note composed:5:53 PM 04/06/2025

## 2025-04-06 NOTE — PROGRESS NOTES
"   Patient ID: Sophia Ann Boeckl is a 24 y.o. female.    E-VISIT     Chief Complaint: General Illness (Entered automatically based on patient selection in [employer].)    The patient initiated a request through [employer]pad on 4/6/2025 for evaluation and management with a chief complaint of General Illness (Entered automatically based on patient selection in [employer].)     I evaluated the questionnaire submission on 04/06/2025 .    Assessment and plan     1. JORGE (generalized anxiety disorder)    2. Mild episode of recurrent major depressive disorder     Message sent to her psychiatrist to see if he can assist with getting her in with a counselor outside of the [facility].     HPI     [facility]    4/6/2025  5:26 PM CDT - Filed by Patient   What do you need help with? Depression   Do you agree to participate in an E-Visit? Yes   If you have any of the following symptoms, please present to your local emergency room or call 911:  I acknowledge   Medication requests for narcotics will not be addressed via an E-Visit.  Please schedule an appointment. I acknowledge   Do you have any of the following pregnancy-related conditions? None   What is the main issue you would like addressed today? Need a referall for a new therapist   Fear of embarrassment causes me to avoid doing things or speaking to people. No   I avoid activities in which I am the center of attention. No   Being embarrassed or looking stupid are among my worst fears. No   I would like to address: Other concerns related to Anxiety or Depression   By selecting "I understand," you acknowledge that the answers that you provide for this questionnaire may not be immediately viewed by your provider or your care team. If you are personally dealing with suicidal thoughts or a crisis, please consider contacting your provider's office.  If your provider is not available, please consider taking action by: calling 911 or the National Suicide " Prevention Lifeline any day, any time at 1-416.729.2484 or texting SIGNS to 798723 for 24/7 anonymous, free crisis counseling. I understand   Over the last 2 weeks, how often have you been bothered by the following problems?   Little interest or pleasure in doing things Several days   Feeling down, depressed, or hopeless Several days   PHQ-2 Score (range: 0 - 6) 2 (Further screening not recommended)   Feeling nervous, anxious, on edge More than half the days   Not being able to stop or control worrying More than half the days   Worrying too much about different things Nearly everyday   Trouble relaxing Not at all   Being so restless that its hard to sit still Not at all   Becoming easily annoyed or irritable Several days   Feeling afraid as if something awful might happen Not at all   If you marked you are experiencing any of the aforementioned problems, how difficult have these made it for you to do your work, take care of things at home, or get along with other people? Somewhat difficult   TOTAL SCORE: (range: 0 - 21) 8   Please describe your symptoms. Anxiety and depression   On a scale of 1-10, where 10 is the worst you can imagine, how severe are your symptoms? (range: 1 - 10) 4   Have you had these symptoms before? Yes   How long have you had these symptoms? More than a month   What helps with your symptoms? Prozac i am perscribed by my psychiatrist and regular therapy sessions   What makes your symptoms feel worse? Not going to therapy   Are your symptoms related to a condition you currently have? Yes   When were you last seen for this condition? 2/15/2025   Provide any additional information you feel is important.    Please attach any relevant images or files    Are you able to take your vital signs? No          No follow-ups on file.    E-Visit Time Tracking:    Day 1 Time (in minutes): 11    Total Time (in minutes): 11

## 2025-04-07 RX ORDER — FLUOXETINE HYDROCHLORIDE 40 MG/1
40 CAPSULE ORAL DAILY
Qty: 90 CAPSULE | Refills: 1 | Status: SHIPPED | OUTPATIENT
Start: 2025-04-07 | End: 2026-04-07

## 2025-04-15 ENCOUNTER — OFFICE VISIT (OUTPATIENT)
Dept: FAMILY MEDICINE | Facility: CLINIC | Age: 25
End: 2025-04-15
Payer: COMMERCIAL

## 2025-04-15 ENCOUNTER — PATIENT OUTREACH (OUTPATIENT)
Dept: PSYCHIATRY | Facility: CLINIC | Age: 25
End: 2025-04-15
Payer: COMMERCIAL

## 2025-04-15 VITALS
HEIGHT: 68 IN | BODY MASS INDEX: 25.07 KG/M2 | HEART RATE: 85 BPM | WEIGHT: 165.38 LBS | SYSTOLIC BLOOD PRESSURE: 110 MMHG | OXYGEN SATURATION: 99 % | DIASTOLIC BLOOD PRESSURE: 68 MMHG

## 2025-04-15 DIAGNOSIS — Z00.00 PREVENTATIVE HEALTH CARE: Primary | ICD-10-CM

## 2025-04-15 DIAGNOSIS — F32.1 CURRENT MODERATE EPISODE OF MAJOR DEPRESSIVE DISORDER WITHOUT PRIOR EPISODE: ICD-10-CM

## 2025-04-15 PROCEDURE — 3008F BODY MASS INDEX DOCD: CPT | Mod: CPTII,S$GLB,, | Performed by: PHYSICIAN ASSISTANT

## 2025-04-15 PROCEDURE — 99999 PR PBB SHADOW E&M-EST. PATIENT-LVL III: CPT | Mod: PBBFAC,,, | Performed by: PHYSICIAN ASSISTANT

## 2025-04-15 PROCEDURE — 3074F SYST BP LT 130 MM HG: CPT | Mod: CPTII,S$GLB,, | Performed by: PHYSICIAN ASSISTANT

## 2025-04-15 PROCEDURE — 99395 PREV VISIT EST AGE 18-39: CPT | Mod: S$GLB,,, | Performed by: PHYSICIAN ASSISTANT

## 2025-04-15 PROCEDURE — 3078F DIAST BP <80 MM HG: CPT | Mod: CPTII,S$GLB,, | Performed by: PHYSICIAN ASSISTANT

## 2025-04-15 PROCEDURE — 1159F MED LIST DOCD IN RCRD: CPT | Mod: CPTII,S$GLB,, | Performed by: PHYSICIAN ASSISTANT

## 2025-04-15 NOTE — PROGRESS NOTES
"Subjective:      Patient ID: Sophia Ann Boeckl is a 24 y.o. female.    Chief Complaint: Annual Exam    Patient is new to me.    HPI  Patient's active medical issues include anxiety/depression, and GERD.    Patient here for an annual exam.  No new health concerns.  Exercising regularly.  Works as a nanny fulltime.    Review of Systems   Constitutional:  Positive for fatigue. Negative for appetite change, chills and fever.   HENT:  Negative for ear pain and sore throat.    Eyes:  Negative for pain.   Respiratory:  Negative for cough and shortness of breath.    Cardiovascular:  Negative for chest pain.   Gastrointestinal:  Positive for constipation and diarrhea. Negative for abdominal pain, blood in stool, nausea and vomiting.   Genitourinary:  Negative for dysuria, frequency and hematuria.   Musculoskeletal:  Negative for neck pain.   Skin:  Negative for rash.   Neurological:  Positive for headaches (baseline). Negative for dizziness and light-headedness.   Psychiatric/Behavioral:  Positive for dysphoric mood (controlled as per patient). Negative for sleep disturbance and suicidal ideas. The patient is nervous/anxious.        Objective:   /68   Pulse 85   Ht 5' 8" (1.727 m)   Wt 75 kg (165 lb 5.5 oz)   LMP 03/23/2025 (Exact Date)   SpO2 99%   BMI 25.14 kg/m²     Physical Exam  Vitals reviewed.   Constitutional:       General: She is not in acute distress.     Appearance: Normal appearance. She is well-developed and well-groomed.   HENT:      Head: Normocephalic and atraumatic.      Right Ear: Hearing, tympanic membrane, ear canal and external ear normal.      Left Ear: Hearing, tympanic membrane, ear canal and external ear normal.      Nose: Nose normal.      Right Sinus: No maxillary sinus tenderness or frontal sinus tenderness.      Left Sinus: No maxillary sinus tenderness or frontal sinus tenderness.      Mouth/Throat:      Lips: Pink.      Mouth: Mucous membranes are moist.      Pharynx: Oropharynx is " clear.   Eyes:      General: Lids are normal.      Conjunctiva/sclera: Conjunctivae normal.   Neck:      Trachea: Phonation normal.   Cardiovascular:      Rate and Rhythm: Normal rate and regular rhythm.      Heart sounds: Normal heart sounds. No murmur heard.   No friction rub. No gallop.    Pulmonary:      Effort: Pulmonary effort is normal. No respiratory distress.      Breath sounds: Normal breath sounds. No decreased breath sounds, wheezing, rhonchi or rales.   Abdominal:      Palpations: Abdomen is soft.      Tenderness: There is no abdominal tenderness.   Musculoskeletal:         General: Normal range of motion.      Cervical back: Normal range of motion.   Lymphadenopathy:      Head:      Right side of head: No submental, submandibular, tonsillar, preauricular, posterior auricular or occipital adenopathy.      Left side of head: No submental, submandibular, tonsillar, preauricular, posterior auricular or occipital adenopathy.      Cervical: No cervical adenopathy.   Skin:     General: Skin is warm and dry.      Findings: No rash.   Neurological:      General: No focal deficit present.      Mental Status: She is alert and oriented to person, place, and time.   Psychiatric:         Attention and Perception: Attention normal.         Mood and Affect: Mood normal.         Speech: Speech normal.         Behavior: Behavior normal. Behavior is cooperative.         Thought Content: Thought content normal.         Cognition and Memory: Cognition normal.         Judgment: Judgment normal.      Assessment:      1. Preventative health care    2. Current moderate episode of major depressive disorder without prior episode       Plan:   1. Preventative health care (Primary)    2. Current moderate episode of major depressive disorder without prior episode  Controlled.  Would like a new therapist.  - Ambulatory referral/consult to [facility] (Non-Opioids); Future    Follow up as needed.  Patient agreed  with plan and expressed understanding.    Thank you for allowing me to serve you,

## 2025-06-27 ENCOUNTER — PATIENT MESSAGE (OUTPATIENT)
Dept: FAMILY MEDICINE | Facility: CLINIC | Age: 25
End: 2025-06-27
Payer: COMMERCIAL

## 2025-06-27 DIAGNOSIS — F32.1 CURRENT MODERATE EPISODE OF MAJOR DEPRESSIVE DISORDER WITHOUT PRIOR EPISODE: Primary | ICD-10-CM

## (undated) DEVICE — TUBING SUC UNIV W/CONN 12FT

## (undated) DEVICE — SEE L#120831

## (undated) DEVICE — SPONGE COTTON TRAY 4X4IN

## (undated) DEVICE — TRAY FOLEY 16FR INFECTION CONT

## (undated) DEVICE — CLOSURE SKIN STERI STRIP 1/2X4

## (undated) DEVICE — SUT 5/0 18IN PLAIN FAST AB

## (undated) DEVICE — GOWN SMART IMP BREATHABLE XXLG

## (undated) DEVICE — GLOVE BIOGEL ECLIPSE SZ 7.5

## (undated) DEVICE — Device

## (undated) DEVICE — SOL 9P NACL IRR PIC IL

## (undated) DEVICE — ADHESIVE MASTISOL VIAL 48/BX

## (undated) DEVICE — TUBE SUMP NASOGASTRIC 16FR

## (undated) DEVICE — ELECTRODE NEEDLE 1IN

## (undated) DEVICE — SEE MEDLINE ITEM 157194

## (undated) DEVICE — SUT 5/0 27IN PDS II VIO MO

## (undated) DEVICE — DRAPE STERI-DRAPE 1000 17X11IN

## (undated) DEVICE — BLADE SURG STAINLESS STEEL #15

## (undated) DEVICE — APPLICATOR COTTON TIP 6IN STRL

## (undated) DEVICE — SYR B-D DISP CONTROL 10CC100/C

## (undated) DEVICE — CORD BIPOLAR 12 FOOT

## (undated) DEVICE — ELECTRODE REM PLYHSV RETURN 9

## (undated) DEVICE — SPLINT AQUAPLAST-T 1/8X18X24IN

## (undated) DEVICE — SKINMARKER & RULER REGULAR X-F

## (undated) DEVICE — BACITRACIN ZINC OINT 0.9GM

## (undated) DEVICE — SPONGE PATTY SURGICAL .5X3IN

## (undated) DEVICE — HEMOSTAT SURGICEL 2X3IN

## (undated) DEVICE — SEE MEDLINE ITEM 146313

## (undated) DEVICE — SYR 10CC LUER LOCK

## (undated) DEVICE — SUT 5-0 CHROMIC GUT / P-3

## (undated) DEVICE — SEE MEDLINE ITEM 152622

## (undated) DEVICE — DRESSING TRANS 2X2 TEGADERM

## (undated) DEVICE — MARKER SKIN STND TIP BLUE BARR

## (undated) DEVICE — BLADE INFERIOR TURBINATE 5/PK

## (undated) DEVICE — BOWL STERILE LARGE 32OZ

## (undated) DEVICE — DRAPE STERI INSTRUMENT 1018

## (undated) DEVICE — BLADE #15 STERILE CARBON

## (undated) DEVICE — SEE MEDLINE ITEM 152487

## (undated) DEVICE — SPONGE SURGIFOAM GELATIN SZ50

## (undated) DEVICE — GAUZE SPONGE 4X4 12PLY

## (undated) DEVICE — SEE MEDLINE ITEM 156934

## (undated) DEVICE — PENCIL ELECTROSURG HOLST W/BLD